# Patient Record
Sex: MALE | Race: ASIAN | NOT HISPANIC OR LATINO | URBAN - METROPOLITAN AREA
[De-identification: names, ages, dates, MRNs, and addresses within clinical notes are randomized per-mention and may not be internally consistent; named-entity substitution may affect disease eponyms.]

---

## 2018-12-19 PROBLEM — Z00.00 ENCOUNTER FOR PREVENTIVE HEALTH EXAMINATION: Status: ACTIVE | Noted: 2018-12-19

## 2020-11-10 ENCOUNTER — INPATIENT (INPATIENT)
Facility: HOSPITAL | Age: 25
LOS: 2 days | Discharge: HOME | End: 2020-11-13
Attending: STUDENT IN AN ORGANIZED HEALTH CARE EDUCATION/TRAINING PROGRAM | Admitting: STUDENT IN AN ORGANIZED HEALTH CARE EDUCATION/TRAINING PROGRAM
Payer: MEDICAID

## 2020-11-10 VITALS
OXYGEN SATURATION: 100 % | HEART RATE: 108 BPM | SYSTOLIC BLOOD PRESSURE: 116 MMHG | DIASTOLIC BLOOD PRESSURE: 83 MMHG | RESPIRATION RATE: 16 BRPM | TEMPERATURE: 97 F

## 2020-11-10 LAB
ALBUMIN SERPL ELPH-MCNC: 4.1 G/DL — SIGNIFICANT CHANGE UP (ref 3.3–5)
ALP SERPL-CCNC: 70 U/L — SIGNIFICANT CHANGE UP (ref 40–120)
ALT FLD-CCNC: 13 U/L — SIGNIFICANT CHANGE UP (ref 4–41)
ANION GAP SERPL CALC-SCNC: 18 MMO/L — HIGH (ref 7–14)
AST SERPL-CCNC: 15 U/L — SIGNIFICANT CHANGE UP (ref 4–40)
BASOPHILS # BLD AUTO: 0.04 K/UL — SIGNIFICANT CHANGE UP (ref 0–0.2)
BASOPHILS NFR BLD AUTO: 0.2 % — SIGNIFICANT CHANGE UP (ref 0–2)
BILIRUB SERPL-MCNC: 0.3 MG/DL — SIGNIFICANT CHANGE UP (ref 0.2–1.2)
BUN SERPL-MCNC: 15 MG/DL — SIGNIFICANT CHANGE UP (ref 7–23)
CALCIUM SERPL-MCNC: 10.2 MG/DL — SIGNIFICANT CHANGE UP (ref 8.4–10.5)
CHLORIDE SERPL-SCNC: 101 MMOL/L — SIGNIFICANT CHANGE UP (ref 98–107)
CO2 SERPL-SCNC: 23 MMOL/L — SIGNIFICANT CHANGE UP (ref 22–31)
CREAT SERPL-MCNC: 0.8 MG/DL — SIGNIFICANT CHANGE UP (ref 0.5–1.3)
EOSINOPHIL # BLD AUTO: 0.01 K/UL — SIGNIFICANT CHANGE UP (ref 0–0.5)
EOSINOPHIL NFR BLD AUTO: 0.1 % — SIGNIFICANT CHANGE UP (ref 0–6)
GLUCOSE SERPL-MCNC: 138 MG/DL — HIGH (ref 70–99)
HCT VFR BLD CALC: 47.6 % — SIGNIFICANT CHANGE UP (ref 39–50)
HGB BLD-MCNC: 13.6 G/DL — SIGNIFICANT CHANGE UP (ref 13–17)
IMM GRANULOCYTES NFR BLD AUTO: 0.6 % — SIGNIFICANT CHANGE UP (ref 0–1.5)
LIDOCAIN IGE QN: 14.4 U/L — SIGNIFICANT CHANGE UP (ref 7–60)
LYMPHOCYTES # BLD AUTO: 0.69 K/UL — LOW (ref 1–3.3)
LYMPHOCYTES # BLD AUTO: 4 % — LOW (ref 13–44)
MAGNESIUM SERPL-MCNC: 1.9 MG/DL — SIGNIFICANT CHANGE UP (ref 1.6–2.6)
MCHC RBC-ENTMCNC: 21.2 PG — LOW (ref 27–34)
MCHC RBC-ENTMCNC: 28.6 % — LOW (ref 32–36)
MCV RBC AUTO: 74.1 FL — LOW (ref 80–100)
MONOCYTES # BLD AUTO: 1.07 K/UL — HIGH (ref 0–0.9)
MONOCYTES NFR BLD AUTO: 6.1 % — SIGNIFICANT CHANGE UP (ref 2–14)
NEUTROPHILS # BLD AUTO: 15.52 K/UL — HIGH (ref 1.8–7.4)
NEUTROPHILS NFR BLD AUTO: 89 % — HIGH (ref 43–77)
NRBC # FLD: 0 K/UL — SIGNIFICANT CHANGE UP (ref 0–0)
PLATELET # BLD AUTO: 486 K/UL — HIGH (ref 150–400)
PMV BLD: 9.5 FL — SIGNIFICANT CHANGE UP (ref 7–13)
POTASSIUM SERPL-MCNC: 3.7 MMOL/L — SIGNIFICANT CHANGE UP (ref 3.5–5.3)
POTASSIUM SERPL-SCNC: 3.7 MMOL/L — SIGNIFICANT CHANGE UP (ref 3.5–5.3)
PROT SERPL-MCNC: 7.9 G/DL — SIGNIFICANT CHANGE UP (ref 6–8.3)
RBC # BLD: 6.42 M/UL — HIGH (ref 4.2–5.8)
RBC # FLD: 21.6 % — HIGH (ref 10.3–14.5)
SODIUM SERPL-SCNC: 142 MMOL/L — SIGNIFICANT CHANGE UP (ref 135–145)
WBC # BLD: 17.43 K/UL — HIGH (ref 3.8–10.5)
WBC # FLD AUTO: 17.43 K/UL — HIGH (ref 3.8–10.5)

## 2020-11-10 PROCEDURE — 99285 EMERGENCY DEPT VISIT HI MDM: CPT

## 2020-11-10 RX ORDER — SODIUM CHLORIDE 9 MG/ML
1000 INJECTION INTRAMUSCULAR; INTRAVENOUS; SUBCUTANEOUS ONCE
Refills: 0 | Status: COMPLETED | OUTPATIENT
Start: 2020-11-10 | End: 2020-11-10

## 2020-11-10 RX ORDER — ONDANSETRON 8 MG/1
4 TABLET, FILM COATED ORAL ONCE
Refills: 0 | Status: COMPLETED | OUTPATIENT
Start: 2020-11-10 | End: 2020-11-10

## 2020-11-10 RX ADMIN — SODIUM CHLORIDE 1000 MILLILITER(S): 9 INJECTION INTRAMUSCULAR; INTRAVENOUS; SUBCUTANEOUS at 22:13

## 2020-11-10 RX ADMIN — ONDANSETRON 4 MILLIGRAM(S): 8 TABLET, FILM COATED ORAL at 19:30

## 2020-11-10 RX ADMIN — SODIUM CHLORIDE 1000 MILLILITER(S): 9 INJECTION INTRAMUSCULAR; INTRAVENOUS; SUBCUTANEOUS at 19:31

## 2020-11-10 NOTE — ED ADULT NURSE NOTE - CHIEF COMPLAINT QUOTE
Pt c/o vomiting since last night states he was recently dx with IBD, denies other PMH. +abdominal pain

## 2020-11-10 NOTE — ED PROVIDER NOTE - CLINICAL SUMMARY MEDICAL DECISION MAKING FREE TEXT BOX
26 y/o M recently dx with Crohn's p/w intractable vomiting x today.  plan  - labs  - ivf  - anti-emetics   - PO challenge Marcel att: 26 y/o M recently dx with Crohn's p/w intractable vomiting x today with diffuse abdominal pain. The patient says that his pain had been controlled with budesonide until now.   -labs, pain control, CT abdomen/pelvis to r/o SBO vs. fistula.    - labs  - ivf  - anti-emetics   - PO challenge

## 2020-11-10 NOTE — ED PROVIDER NOTE - OBJECTIVE STATEMENT
24 y/o M recently dx with Crohn's p/w intractable vomiting x today. Pt reports he was recent admitted to OSH where he had full work up including endoscopy, work up highly suspicious for Crohn's. Pt started on Budesonide, which states has improved symptoms. States today started having mid abdominal pain a/w n/v. Has not been able to tolerate anything PO. Reports emesis every 5-10 minutes. Pt reports normal BMs. No fever, chills, chest pain, sob, cough, headache, dizziness.

## 2020-11-10 NOTE — ED PROVIDER NOTE - CONSTITUTIONAL, MLM
normal... Well appearing, awake, alert, oriented to person, place, time/situation. Actively vomiting.

## 2020-11-10 NOTE — ED PROVIDER NOTE - CCCP TRG CHIEF CMPLNT
Consent: The patient's consent was obtained including but not limited to risks of crusting, scabbing, blistering, scarring, darker or lighter pigmentary change, recurrence, incomplete removal and infection.
Detail Level: Detailed
Number Of Freeze-Thaw Cycles: 3 freeze-thaw cycles
Render Post-Care Instructions In Note?: no
Duration Of Freeze Thaw-Cycle (Seconds): 3
Post-Care Instructions: I reviewed with the patient in detail post-care instructions. Patient is to wear sunprotection, and avoid picking at any of the treated lesions. Pt may apply Vaseline to crusted or scabbing areas.
vomiting

## 2020-11-11 DIAGNOSIS — K56.609 UNSPECIFIED INTESTINAL OBSTRUCTION, UNSPECIFIED AS TO PARTIAL VERSUS COMPLETE OBSTRUCTION: ICD-10-CM

## 2020-11-11 LAB
24R-OH-CALCIDIOL SERPL-MCNC: 29.5 NG/ML — LOW (ref 30–80)
ALBUMIN SERPL ELPH-MCNC: 3.2 G/DL — LOW (ref 3.3–5)
ALP SERPL-CCNC: 56 U/L — SIGNIFICANT CHANGE UP (ref 40–120)
ALT FLD-CCNC: 12 U/L — SIGNIFICANT CHANGE UP (ref 4–41)
ANION GAP SERPL CALC-SCNC: 11 MMO/L — SIGNIFICANT CHANGE UP (ref 7–14)
APTT BLD: 28 SEC — SIGNIFICANT CHANGE UP (ref 27–36.3)
AST SERPL-CCNC: 10 U/L — SIGNIFICANT CHANGE UP (ref 4–40)
BILIRUB DIRECT SERPL-MCNC: < 0.2 MG/DL — SIGNIFICANT CHANGE UP (ref 0.1–0.2)
BILIRUB SERPL-MCNC: 0.3 MG/DL — SIGNIFICANT CHANGE UP (ref 0.2–1.2)
BLD GP AB SCN SERPL QL: NEGATIVE — SIGNIFICANT CHANGE UP
BUN SERPL-MCNC: 13 MG/DL — SIGNIFICANT CHANGE UP (ref 7–23)
CALCIUM SERPL-MCNC: 8.9 MG/DL — SIGNIFICANT CHANGE UP (ref 8.4–10.5)
CHLORIDE SERPL-SCNC: 108 MMOL/L — HIGH (ref 98–107)
CO2 SERPL-SCNC: 23 MMOL/L — SIGNIFICANT CHANGE UP (ref 22–31)
CREAT SERPL-MCNC: 0.7 MG/DL — SIGNIFICANT CHANGE UP (ref 0.5–1.3)
CRP SERPL-MCNC: 58.9 MG/L — HIGH
ERYTHROCYTE [SEDIMENTATION RATE] IN BLOOD: 23 MM/HR — HIGH (ref 1–15)
FERRITIN SERPL-MCNC: 76.55 NG/ML — SIGNIFICANT CHANGE UP (ref 30–400)
FOLATE SERPL-MCNC: 14.8 NG/ML — SIGNIFICANT CHANGE UP (ref 4.7–20)
GLUCOSE SERPL-MCNC: 100 MG/DL — HIGH (ref 70–99)
HCT VFR BLD CALC: 37.8 % — LOW (ref 39–50)
HGB BLD-MCNC: 10.8 G/DL — LOW (ref 13–17)
INR BLD: 1.33 — HIGH (ref 0.88–1.16)
IRON SATN MFR SERPL: 15 UG/DL — LOW (ref 45–165)
IRON SATN MFR SERPL: 191 UG/DL — SIGNIFICANT CHANGE UP (ref 155–535)
MAGNESIUM SERPL-MCNC: 1.9 MG/DL — SIGNIFICANT CHANGE UP (ref 1.6–2.6)
MCHC RBC-ENTMCNC: 21.7 PG — LOW (ref 27–34)
MCHC RBC-ENTMCNC: 28.6 % — LOW (ref 32–36)
MCV RBC AUTO: 76.1 FL — LOW (ref 80–100)
NRBC # FLD: 0 K/UL — SIGNIFICANT CHANGE UP (ref 0–0)
PHOSPHATE SERPL-MCNC: 4.7 MG/DL — HIGH (ref 2.5–4.5)
PLATELET # BLD AUTO: 370 K/UL — SIGNIFICANT CHANGE UP (ref 150–400)
PMV BLD: 9.3 FL — SIGNIFICANT CHANGE UP (ref 7–13)
POTASSIUM SERPL-MCNC: 4.3 MMOL/L — SIGNIFICANT CHANGE UP (ref 3.5–5.3)
POTASSIUM SERPL-SCNC: 4.3 MMOL/L — SIGNIFICANT CHANGE UP (ref 3.5–5.3)
PROT SERPL-MCNC: 5.5 G/DL — LOW (ref 6–8.3)
PROTHROM AB SERPL-ACNC: 15 SEC — HIGH (ref 10.6–13.6)
RBC # BLD: 4.97 M/UL — SIGNIFICANT CHANGE UP (ref 4.2–5.8)
RBC # FLD: 20.6 % — HIGH (ref 10.3–14.5)
RH IG SCN BLD-IMP: POSITIVE — SIGNIFICANT CHANGE UP
SARS-COV-2 IGG SERPL QL IA: NEGATIVE — SIGNIFICANT CHANGE UP
SARS-COV-2 IGM SERPL IA-ACNC: <0.1 INDEX — SIGNIFICANT CHANGE UP
SARS-COV-2 RNA SPEC QL NAA+PROBE: SIGNIFICANT CHANGE UP
SODIUM SERPL-SCNC: 142 MMOL/L — SIGNIFICANT CHANGE UP (ref 135–145)
UIBC SERPL-MCNC: 175.7 UG/DL — SIGNIFICANT CHANGE UP (ref 110–370)
VIT B12 SERPL-MCNC: 1699 PG/ML — HIGH (ref 200–900)
WBC # BLD: 9.39 K/UL — SIGNIFICANT CHANGE UP (ref 3.8–10.5)
WBC # FLD AUTO: 9.39 K/UL — SIGNIFICANT CHANGE UP (ref 3.8–10.5)

## 2020-11-11 PROCEDURE — 99223 1ST HOSP IP/OBS HIGH 75: CPT

## 2020-11-11 PROCEDURE — 71045 X-RAY EXAM CHEST 1 VIEW: CPT | Mod: 26

## 2020-11-11 PROCEDURE — 99222 1ST HOSP IP/OBS MODERATE 55: CPT | Mod: GC

## 2020-11-11 PROCEDURE — 74177 CT ABD & PELVIS W/CONTRAST: CPT | Mod: 26

## 2020-11-11 RX ORDER — ACETAMINOPHEN 500 MG
1000 TABLET ORAL ONCE
Refills: 0 | Status: COMPLETED | OUTPATIENT
Start: 2020-11-11 | End: 2020-11-11

## 2020-11-11 RX ORDER — TETRACAINE/BENZOCAINE/BUTAMBEN 2%-14%-2%
1 OINTMENT (GRAM) TOPICAL
Refills: 0 | Status: DISCONTINUED | OUTPATIENT
Start: 2020-11-11 | End: 2020-11-13

## 2020-11-11 RX ORDER — BENZOCAINE AND MENTHOL 5; 1 G/100ML; G/100ML
1 LIQUID ORAL
Refills: 0 | Status: DISCONTINUED | OUTPATIENT
Start: 2020-11-11 | End: 2020-11-13

## 2020-11-11 RX ORDER — BENZOCAINE AND MENTHOL 5; 1 G/100ML; G/100ML
1 LIQUID ORAL ONCE
Refills: 0 | Status: COMPLETED | OUTPATIENT
Start: 2020-11-11 | End: 2020-11-11

## 2020-11-11 RX ORDER — ENOXAPARIN SODIUM 100 MG/ML
40 INJECTION SUBCUTANEOUS DAILY
Refills: 0 | Status: DISCONTINUED | OUTPATIENT
Start: 2020-11-11 | End: 2020-11-13

## 2020-11-11 RX ORDER — METRONIDAZOLE 500 MG
500 TABLET ORAL EVERY 8 HOURS
Refills: 0 | Status: DISCONTINUED | OUTPATIENT
Start: 2020-11-11 | End: 2020-11-13

## 2020-11-11 RX ORDER — MAGNESIUM SULFATE 500 MG/ML
1 VIAL (ML) INJECTION ONCE
Refills: 0 | Status: COMPLETED | OUTPATIENT
Start: 2020-11-11 | End: 2020-11-11

## 2020-11-11 RX ORDER — CIPROFLOXACIN LACTATE 400MG/40ML
400 VIAL (ML) INTRAVENOUS EVERY 12 HOURS
Refills: 0 | Status: DISCONTINUED | OUTPATIENT
Start: 2020-11-11 | End: 2020-11-13

## 2020-11-11 RX ORDER — SODIUM CHLORIDE 9 MG/ML
1000 INJECTION, SOLUTION INTRAVENOUS
Refills: 0 | Status: DISCONTINUED | OUTPATIENT
Start: 2020-11-11 | End: 2020-11-12

## 2020-11-11 RX ADMIN — Medication 1 SPRAY(S): at 05:36

## 2020-11-11 RX ADMIN — Medication 100 MILLIGRAM(S): at 21:23

## 2020-11-11 RX ADMIN — SODIUM CHLORIDE 125 MILLILITER(S): 9 INJECTION, SOLUTION INTRAVENOUS at 01:23

## 2020-11-11 RX ADMIN — Medication 200 MILLIGRAM(S): at 05:36

## 2020-11-11 RX ADMIN — ENOXAPARIN SODIUM 40 MILLIGRAM(S): 100 INJECTION SUBCUTANEOUS at 11:15

## 2020-11-11 RX ADMIN — Medication 200 MILLIGRAM(S): at 18:00

## 2020-11-11 RX ADMIN — BENZOCAINE AND MENTHOL 1 LOZENGE: 5; 1 LIQUID ORAL at 03:36

## 2020-11-11 RX ADMIN — Medication 100 MILLIGRAM(S): at 03:35

## 2020-11-11 RX ADMIN — Medication 100 GRAM(S): at 11:15

## 2020-11-11 RX ADMIN — SODIUM CHLORIDE 125 MILLILITER(S): 9 INJECTION, SOLUTION INTRAVENOUS at 03:36

## 2020-11-11 RX ADMIN — Medication 400 MILLIGRAM(S): at 02:37

## 2020-11-11 RX ADMIN — Medication 100 MILLIGRAM(S): at 14:19

## 2020-11-11 RX ADMIN — Medication 20 MILLIGRAM(S): at 21:23

## 2020-11-11 NOTE — CONSULT NOTE ADULT - ATTENDING COMMENTS
Grisel Mullins  Pager: [phone]. If no response or past 5 pm call [phone].
History/PE as above. Patient seen/examined. Admitted with small bowel obstruction attributed to active Crohn's disease. Recent diagnosis of Crohn's disease noted-past 6 months has experienced significant weight loss and abdominal pain. Diagnosed at [facility] 10/2020. Had double balloon enteroscopy one week ago. So far treated with budesonide 9 mg daily. Patient had felt he was improving but prior to admission experienced increased severity of abdominal pain and multiple episodes of vomiting prompting hospitalization with detection of high-grade SBO. Patient's Crohn disease is more in the proximal ileum/distal jejunum. Patient had been planned to initiate monotherapy with Remicade. However, indeterminant QTF resulted in delay in initiation of therapy. Today patient reports passing some flatus. No bowel movement yet. Experiencing some discomfort from NG tube but otherwise comfortable/NAD. Abdomen-soft/nondistended.     IMP: Crohn's enteritis-somewhat proximal disease affecting proximal ileum/distal jejunum. Active inflammatory component without evident fistula, abscess et cetera. High-grade obstruction noted on CT.    REC:  -N.p.o./IV hydration and an NGT decompression as per surgery team  -Monitor serial CBC/BMP  -Monitor CRP/ESR q.o.d.  -DVT prophylaxis  -Currently on IV Cipro/metronidazole. Okay to continue for now  -In view of progression of disease complicated by small bowel obstruction despite oral budesonide plan is for Solu-Medrol 20 mg IV 8 hours. Hopefully patient will exhibit prompt response with resolution of SBO permitting NGT removal.  -Pending ID clearance will proceed with initiation of therapy with Remicade 10 mg/kilogram.

## 2020-11-11 NOTE — H&P ADULT - NSHPPHYSICALEXAM_GEN_ALL_CORE
General: No Acute Distress.  Neuro: Alert and oriented, no focal deficits, moves all extremities spontaneously.  HEENT: Extraocular movements intact. Mucosa moist.   Respiratory: Airway patent, respirations unlabored.  Cardiovascular: Pulse present.   Abdomen: Thin, soft, tender in L abdomen, nondistended.  Genitourinary: No obvious lesions.  Extremities: Warm, moves all four.  Musculoskeletal: No tenderness of extremities, mobile joints.   Integumentary: No obvious lesions.

## 2020-11-11 NOTE — CONSULT NOTE ADULT - SUBJECTIVE AND OBJECTIVE BOX
Patient is a 25y old  Male who presents with a chief complaint of Small bowel obstruction due to Crohn's stricture (11 Nov 2020 07:59)      HPI:  Mr. Griffiths is a 25 Year-Old Gentleman recently diagnosed with Crohn's on budesonide, presenting with one day of severe abdominal pain, nausea/emesis. He initially presented last month to [facility] with similar symptoms of abdominal pain, nausea/emesis, had workup done including MR Enterography, Push Enteroscopy with biopsy consistent with Crohn's. He was started on budesonide with plan to transition to Remicade. However since yesterday evening, had one episode of abdominal pain, emesis that resolved. Was able to tolerate Per Os intake earlier in day, however in evening developed severe symptoms of pain, nausea/emesis, so he presented to the hospital. Has not passed gas or had bowel movement since onset of symptoms.     On evaluation in the Emergency Department, vitals were notable for tachycardia to 100s. Physical exam notable for L sided abdominal tenderness to palpation. Labs drawn, notable for WBC 17.4. CT Imaging obtained, with findings of short segment jejunal/ileal thickening causing small bowel obstruction. Nasogastric tube placed in ED with 250 cc clear yellow output.   Above reviewed:   Pt says he was born in [place], moved to [place] when he was 2 months old, has visited a few times not within last few years. 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 No respiratory symptoms. No exposure to anyone with known TB, never incarcerated.       PAST MEDICAL & SURGICAL HISTORY:  Crohn disease    No significant past surgical history        REVIEW OF SYSTEMS    General: Denies any chills. Fevers absent    Skin: No rash  	  Ophthalmologic: Denies any visual complaints, discharge, redness.  	  ENT: No nasal congestion or throat pain.     Respiratory and Thorax: No cough, sputum. Denies shortness of breath.  	  Cardiovascular: No chest pain,    Gastrointestinal: per hpi     Genitourinary: No dysuria, frequency. No flank pain.    Musculoskeletal: No joint swelling.    Neurological: No extremity weakness.    Psychiatric: No hallucinations	    Extremities: No swelling     Endocrine: No polyuria or polydipsia     Allergic/Immunologic: No hives       Social history:  lives with family, medical student, no smoking       FAMILY HISTORY:  No family hx of autoimmune ds in first degree relatives.       Allergies  No Known Allergies      Antimicrobials:  ciprofloxacin   IVPB 400 milliGRAM(s) IV Intermittent every 12 hours  metroNIDAZOLE  IVPB 500 milliGRAM(s) IV Intermittent every 8 hours      Vital Signs Last 24 Hrs  T(C): 36.3 (11 Nov 2020 09:31), Max: 36.9 (10 Nov 2020 22:10)  T(F): 97.3 (11 Nov 2020 09:31), Max: 98.5 (10 Nov 2020 22:10)  HR: 98 (11 Nov 2020 09:31) (92 - 109)  BP: 110/66 (11 Nov 2020 09:31) (105/69 - 122/85)  BP(mean): --  RR: 16 (11 Nov 2020 09:31) (16 - 18)  SpO2: 97% (11 Nov 2020 09:31) (97% - 100%)      PHYSICAL EXAM: Patient in no acute distress.    Constitutional: Comfortable. Awake and alert    Eyes: No discharge or conjunctival injection    ENT: No thrush. No pharyngeal exudate or erythema.    Neck: Supple, + NG tube     Respiratory: Good air entry bilaterally.    Cardiovascular: S1 S2 wnl, No murmurs.    Gastrointestinal: Soft BS(+) no tenderness, non distended.    Genitourinary: No CVA tenderness     Extremities: No edema.    Vascular: peripheral pulses felt    Neurological: AAO X 3. No grossly focal deficits.    Skin: No rash     Musculoskeletal: No joint swelling.    Psychiatric: Affect normal.                              10.8   9.39  )-----------( 370      ( 11 Nov 2020 04:50 )             37.8       11-11    142  |  108<H>  |  13  ----------------------------<  100<H>  4.3   |  23  |  0.70    Ca    8.9      11 Nov 2020 04:50  Phos  4.7     11-11  Mg     1.9     11-11    TPro  5.5<L>  /  Alb  3.2<L>  /  TBili  0.3  /  DBili  < 0.2  /  AST  10  /  ALT  12  /  AlkPhos  56  11-11      COVID-19 PCR . (11.11.20 @ 01:42)   COVID-19 PCR: NotDetec      Radiology: Imaging reviewed and visualized personally [ x]    < from: CT Abdomen and Pelvis w/ IV Cont (11.11.20 @ 00:10) >  IMPRESSION:  Discontinuous areas of bowel wall thickening and luminal narrowing in the distal jejunum/proximal ileum located in the lowerabdomen compatible with foci of enteritis with possible stricturing likely correlating with history of Crohn's disease resulting in an upstream small bowel obstruction.      < from: Xray Chest 1 View- PORTABLE-Urgent (Xray Chest 1 View- PORTABLE-Urgent .) (11.11.20 @ 06:11) >  FINDINGS:      Lines and Tubes: The tip of the enteric tube is in the distal esophagus. The proximal side-port is in the distal esophagus.      Lungs: The lungs are clear.      Pleura: No pleural effusion or pneumothorax.      Heart and Mediastinum: The heart is normal in size.      Skeletal: Unremarkable.

## 2020-11-11 NOTE — PATIENT PROFILE ADULT - VISION (WITH CORRECTIVE LENSES IF THE PATIENT USUALLY WEARS THEM):
Partially impaired: cannot see medication labels or newsprint, but can see obstacles in path, and the surrounding layout; can count fingers at arm's length
suprapubic pain

## 2020-11-11 NOTE — CONSULT NOTE ADULT - SUBJECTIVE AND OBJECTIVE BOX
Chief Complaint:  Patient is a 25y old  Male who presents with a chief complaint of Small bowel obstruction due to Crohn's stricture (11 Nov 2020 03:45)      HPI:  Mr. Griffiths is a 26yo M with recently diagnosed Crohn's who presents with abdominal pain x 1d.    Patient with recently diagnosed Crohn's a month prior to presenation, after presenting to [facility] with abdominal pain. He was started on budesonide with plan to transition to Remicade. He follows with Dr. Alfie Rivers.   His symptoms improved until the day prior to presentation when he started having diffuse severe, abdominal pain, associated with nausea and emesis. No flatus since onset of symptoms.     In the ED: HR 100s. Surgery consulted, physical exam notable for L sided abdominal tenderness to palpation. Labs drawn, notable for WBC 17.4. CT Imaging obtained, with findings of short segment jejunal/ileal thickening causing small bowel obstruction. Nasogastric tube placed in ED with 250 cc clear yellow output.       Allergies:  No Known Allergies      Home Medications:  budesonide 9 mg oral tablet, extended release: 1 tab(s) orally once a day (in the morning) (11 Nov 2020 03:53)  cyanocobalamin 100 mcg oral tablet: 1 tab(s) orally once a day (11 Nov 2020 03:53)  folic acid 1 mg oral tablet: 1 tab(s) orally once a day (11 Nov 2020 03:53)  Vitamin D3 2000 intl units (50 mcg) oral tablet: 1 tab(s) orally once a day (11 Nov 2020 03:53)    Hospital Medications:  benzocaine 15 mG/menthol 3.6 mG (Sugar-Free) Lozenge 1 Lozenge Oral every 3 hours PRN  ciprofloxacin   IVPB 400 milliGRAM(s) IV Intermittent every 12 hours  enoxaparin Injectable 40 milliGRAM(s) SubCutaneous daily  lactated ringers. 1000 milliLiter(s) IV Continuous <Continuous>  metroNIDAZOLE  IVPB 500 milliGRAM(s) IV Intermittent every 8 hours  tetracaine/benzocaine/butamben Spray 1 Spray(s) Topical every 3 hours PRN      PMHX/PSHX:  Crohn disease    No significant past surgical history        Family history:      Denies family history of colon cancer/polyps, stomach cancer/polyps, pancreatic cancer/masses, liver cancer/disease, ovarian cancer and endometrial cancer.    Social History:     Tob: Denies  EtOH: Denies  Illicit Drugs: Denies    ROS:   General:  No wt loss, fevers, chills, night sweats, fatigue  Eyes:  Good vision, no reported pain  ENT:  No sore throat, pain, runny nose, dysphagia  CV:  No pain, palpitations, hypo/hypertension  Pulm:  No dyspnea, cough, tachypnea, wheezing  GI:  As above  :  No pain, bleeding, incontinence, nocturia  Muscle:  No pain, weakness  Neuro:  No weakness, tingling, memory problems  Psych:  No fatigue, insomnia, mood problems, depression  Endocrine:  No polyuria, polydipsia, cold/heat intolerance  Heme:  No petechiae, ecchymosis, easy bruisability  Skin:  No rash, tattoos, scars, edema    PHYSICAL EXAM:     GENERAL:  No acute distress  HEENT:  Normocephalic/atraumatic, no scleral icterus  CHEST:  Clear to auscultation bilaterally, no wheezes/rales/ronchi, no accessory muscle use  HEART:  Regular rate and rhythm, no murmurs/rubs/gallops  ABDOMEN:  Soft, ttp in LLQ, no rebound or guarding, non-distended, no masses, no hepato-splenomegaly, no signs of chronic liver disease  EXTREMITIES: No cyanosis, clubbing, or edema  SKIN:  No rash/erythema/ecchymoses/petechiae/wounds/abscess/warm/dry  NEURO:  Alert and oriented x 3, no asterixis    Vital Signs:  Vital Signs Last 24 Hrs  T(C): 36.3 (11 Nov 2020 05:40), Max: 36.9 (10 Nov 2020 22:10)  T(F): 97.4 (11 Nov 2020 05:40), Max: 98.5 (10 Nov 2020 22:10)  HR: 92 (11 Nov 2020 05:40) (92 - 109)  BP: 112/72 (11 Nov 2020 05:40) (105/69 - 122/85)  BP(mean): --  RR: 16 (11 Nov 2020 05:40) (16 - 18)  SpO2: 98% (11 Nov 2020 05:40) (98% - 100%)  Daily Height in cm: 185.42 (11 Nov 2020 03:44)    Daily     LABS:                        10.8   9.39  )-----------( 370      ( 11 Nov 2020 04:50 )             37.8     Mean Cell Volume: 76.1 fL (11-11-20 @ 04:50)    11-11    142  |  108<H>  |  13  ----------------------------<  100<H>  4.3   |  23  |  0.70    Ca    8.9      11 Nov 2020 04:50  Phos  4.7     11-11  Mg     1.9     11-11    TPro  5.5<L>  /  Alb  3.2<L>  /  TBili  0.3  /  DBili  < 0.2  /  AST  10  /  ALT  12  /  AlkPhos  56  11-11    LIVER FUNCTIONS - ( 11 Nov 2020 04:50 )  Alb: 3.2 g/dL / Pro: 5.5 g/dL / ALK PHOS: 56 u/L / ALT: 12 u/L / AST: 10 u/L / GGT: x           PT/INR - ( 11 Nov 2020 01:15 )   PT: 15.0 SEC;   INR: 1.33          PTT - ( 11 Nov 2020 01:15 )  PTT:28.0 SEC    Amylase Serum--      Lipase serum14.4       Ammonia--                          10.8   9.39  )-----------( 370      ( 11 Nov 2020 04:50 )             37.8                         13.6   17.43 )-----------( 486      ( 10 Nov 2020 19:30 )             47.6       Imaging:           Chief Complaint:  Patient is a 25y old  Male who presents with a chief complaint of Small bowel obstruction due to Crohn's stricture (11 Nov 2020 03:45)      HPI:  Mr. Griffiths is a 24yo M with recently diagnosed Crohn's who presents with abdominal pain x 1d.    Patient recently admitted at [facility] (10/20) for abdominal pain. Imaging and double balloon enteroscopy highly suggestive of Crohn's disease (biopsy pending). He was started on budesonide with plan to transition to Remicade. He follows with Dr. Alfie Griffiths.   His symptoms improved until the day prior to presentation when he started having diffuse severe, abdominal pain, associated with nausea and emesis. No flatus since onset of symptoms.     In the ED: HR 100s. Surgery consulted, physical exam notable for L sided abdominal tenderness to palpation. Labs drawn, notable for WBC 17.4. CT Imaging obtained, with findings of short segment jejunal/ileal thickening causing small bowel obstruction. Nasogastric tube placed in ED with 250 cc clear yellow output.       Allergies:  No Known Allergies      Home Medications:  budesonide 9 mg oral tablet, extended release: 1 tab(s) orally once a day (in the morning) (11 Nov 2020 03:53)  cyanocobalamin 100 mcg oral tablet: 1 tab(s) orally once a day (11 Nov 2020 03:53)  folic acid 1 mg oral tablet: 1 tab(s) orally once a day (11 Nov 2020 03:53)  Vitamin D3 2000 intl units (50 mcg) oral tablet: 1 tab(s) orally once a day (11 Nov 2020 03:53)    Hospital Medications:  benzocaine 15 mG/menthol 3.6 mG (Sugar-Free) Lozenge 1 Lozenge Oral every 3 hours PRN  ciprofloxacin   IVPB 400 milliGRAM(s) IV Intermittent every 12 hours  enoxaparin Injectable 40 milliGRAM(s) SubCutaneous daily  lactated ringers. 1000 milliLiter(s) IV Continuous <Continuous>  metroNIDAZOLE  IVPB 500 milliGRAM(s) IV Intermittent every 8 hours  tetracaine/benzocaine/butamben Spray 1 Spray(s) Topical every 3 hours PRN      PMHX/PSHX:  Crohn disease    No significant past surgical history        Family history:      Denies family history of colon cancer/polyps, stomach cancer/polyps, pancreatic cancer/masses, liver cancer/disease, ovarian cancer and endometrial cancer.    Social History:     Tob: Denies  EtOH: Denies  Illicit Drugs: Denies    ROS:   General:  No wt loss, fevers, chills, night sweats, fatigue  Eyes:  Good vision, no reported pain  ENT:  No sore throat, pain, runny nose, dysphagia  CV:  No pain, palpitations, hypo/hypertension  Pulm:  No dyspnea, cough, tachypnea, wheezing  GI:  As above  :  No pain, bleeding, incontinence, nocturia  Muscle:  No pain, weakness  Neuro:  No weakness, tingling, memory problems  Psych:  No fatigue, insomnia, mood problems, depression  Endocrine:  No polyuria, polydipsia, cold/heat intolerance  Heme:  No petechiae, ecchymosis, easy bruisability  Skin:  No rash, tattoos, scars, edema    PHYSICAL EXAM:     GENERAL:  No acute distress  HEENT:  Normocephalic/atraumatic, no scleral icterus  CHEST:  Clear to auscultation bilaterally, no wheezes/rales/ronchi, no accessory muscle use  HEART:  Regular rate and rhythm, no murmurs/rubs/gallops  ABDOMEN:  Soft, ttp in LLQ, no rebound or guarding, non-distended, no masses, no hepato-splenomegaly, no signs of chronic liver disease  EXTREMITIES: No cyanosis, clubbing, or edema  SKIN:  No rash/erythema/ecchymoses/petechiae/wounds/abscess/warm/dry  NEURO:  Alert and oriented x 3, no asterixis    Vital Signs:  Vital Signs Last 24 Hrs  T(C): 36.3 (11 Nov 2020 05:40), Max: 36.9 (10 Nov 2020 22:10)  T(F): 97.4 (11 Nov 2020 05:40), Max: 98.5 (10 Nov 2020 22:10)  HR: 92 (11 Nov 2020 05:40) (92 - 109)  BP: 112/72 (11 Nov 2020 05:40) (105/69 - 122/85)  BP(mean): --  RR: 16 (11 Nov 2020 05:40) (16 - 18)  SpO2: 98% (11 Nov 2020 05:40) (98% - 100%)  Daily Height in cm: 185.42 (11 Nov 2020 03:44)    Daily     LABS:                        10.8   9.39  )-----------( 370      ( 11 Nov 2020 04:50 )             37.8     Mean Cell Volume: 76.1 fL (11-11-20 @ 04:50)    11-11    142  |  108<H>  |  13  ----------------------------<  100<H>  4.3   |  23  |  0.70    Ca    8.9      11 Nov 2020 04:50  Phos  4.7     11-11  Mg     1.9     11-11    TPro  5.5<L>  /  Alb  3.2<L>  /  TBili  0.3  /  DBili  < 0.2  /  AST  10  /  ALT  12  /  AlkPhos  56  11-11    LIVER FUNCTIONS - ( 11 Nov 2020 04:50 )  Alb: 3.2 g/dL / Pro: 5.5 g/dL / ALK PHOS: 56 u/L / ALT: 12 u/L / AST: 10 u/L / GGT: x           PT/INR - ( 11 Nov 2020 01:15 )   PT: 15.0 SEC;   INR: 1.33          PTT - ( 11 Nov 2020 01:15 )  PTT:28.0 SEC    Amylase Serum--      Lipase serum14.4       Ammonia--                          10.8   9.39  )-----------( 370      ( 11 Nov 2020 04:50 )             37.8                         13.6   17.43 )-----------( 486      ( 10 Nov 2020 19:30 )             47.6       Imaging:

## 2020-11-11 NOTE — H&P ADULT - HISTORY OF PRESENT ILLNESS
Mr. Griffiths is a 25 Year-Old Gentleman recently diagnosed with Crohn's on budesonide, presenting with one day of severe abdominal pain, nausea/emesis. He initially presented last month to [facility] with similar symptoms of abdominal pain, nausea/emesis, had workup done including MR Enterography, Push Enteroscopy with biopsy consistent with Crohn's. He was started on budesonide with plan to transition to Remicade. However since yesterday evening, had one episode of abdominal pain, emesis that resolved. Was able to tolerate Per Os intake earlier in day, however in evening developed severe symptoms of pain, nausea/emesis, so he presented to the hospital. Has not passed gas or had bowel movement since onset of symptoms.     On evaluation in the Emergency Department, vitals were notable for tachycardia to 100s. Physical exam notable for L sided abdominal tenderness to palpation. Labs drawn, notable for WBC 17.4. CT Imaging obtained, with findings of short segment jejunal/ileal thickening causing small bowel obstruction. Nasogastric tube placed in ED with 250 cc clear yellow output.     GI: Alfie Rivers.

## 2020-11-11 NOTE — H&P ADULT - ASSESSMENT
Mr. Griffiths is a 25 Year-Old Gentleman recently diagnosed with Crohn's, presenting with one day of severe abdominal pain, nausea/emesis, found to have small bowel obstruction due to Crohn's flare.     - Admit to Surgery, Dr. Oliver Ornelas.   - GI consult placed via Email @ 03:59, currently holding budesonide given NPO status.    - To continue Nasogastric Tube, Nil Per Os, Intravenous Fluids Resuscitation Therapy.   - IV antibiotics: Cipro/Flagyl.   - Will transfer to Colorectal Surgery Service in AM for definitive management.   - Discussed with Attending Surgeon.     B Team Surgery Pager #[phone] Mr. Griffiths is a 25 Year-Old Gentleman recently diagnosed with Crohn's, presenting with one day of severe abdominal pain, nausea/emesis, found to have small bowel obstruction due to Crohn's flare.     - Admit to Surgery, Dr. Oliver Ornelas.   - GI consult placed via Email @ 03:59, currently holding budesonide given NPO status.    - To continue Nasogastric Tube, Nil Per Os, Intravenous Fluids Resuscitation Therapy.   - IV antibiotics: Cipro/Flagyl.   - Crohn's labs and inflammatory markers ordered with AM labs.   - Will transfer to Colorectal Surgery Service in AM for definitive management.   - Discussed with Attending Surgeon.     B Team Surgery Pager #[phone]

## 2020-11-11 NOTE — CONSULT NOTE ADULT - ASSESSMENT
24yo M with recently diagnosed Crohn's who presents with abdominal pain x 1d, found to have SBO in settings of likely Crohn's stricture.    # Crohn's flare - Severe disease given stricturing and SBO. Currently on budesonide. Patient would benefit from initiation of induction therapy (i.e Infliximab), plus a course of IV steroids for a more immediate relief. Patient has documented negative HBV serology and indeterminate QuantiFeron on previous labs done at [facility].   # SBO - improving with conservative measures.    Recommendations:  - Please repeat HBV serologies and QuantiFeron in house  - Please consult ID for biologic clearance given "indeterminate" QuantiFeron result from OSH (negative CXR is reassuring)  - SBO management per surgical team  - CRP, ESR q48h  - CBC, CMP, INR daily  - Patient is at a high risk for VTE, please continue LMWH    Thank you for involving us in the care of this patient. Please reach out if any further questions.    Vaughn Cote, PGY-4  GI Fellow    Available on [employer] Teams  Pager [phone] ([facility]) or [phone] ([facility])  After 5PM/Weekends, please contact the on-call GI fellow: [phone]  Available through Microsoft Teams           24yo M with recently diagnosed Crohn's who presents with abdominal pain x 1d, found to have SBO in settings of likely Crohn's stricture.    # Likely Crohn's flare - Severe disease given stricturing and SBO. Currently on budesonide. Patient would benefit from initiation of induction therapy (i.e Infliximab), plus a course of IV steroids for a more immediate relief. Patient has documented negative HBV serology and indeterminate QuantiFeron on previous labs done at [facility].   # SBO - improving with conservative measures.    Recommendations:  - Please repeat HBV serologies and QuantiFeron in house  - Please consult ID for biologic clearance given "indeterminate" QuantiFeron result from OSH (negative CXR is reassuring)  - SBO management per surgical team  - CRP, ESR q48h  - CBC, CMP, INR daily  - Patient is at a high risk for VTE, please continue LMWH    Thank you for involving us in the care of this patient. Please reach out if any further questions.    Vaughn Cote, PGY-4  GI Fellow    Available on Microsoft Teams  Pager [phone] ([facility]) or [phone] ([facility])  After 5PM/Weekends, please contact the on-call GI fellow: [phone]  Available through Microsoft Teams           26yo M with recently diagnosed Crohn's who presents with abdominal pain x 1d, found to have SBO in settings of likely Crohn's stricture.    # Likely Crohn's flare - Severe disease given stricturing and SBO. Currently on budesonide. Patient would benefit from initiation of induction therapy (i.e Infliximab), plus a course of IV steroids for a more immediate relief. Patient has documented negative HBV serology and indeterminate QuantiFeron on previous labs done at [facility].   # SBO - improving with conservative measures.    Recommendations:  - Please repeat HBV serologies and QuantiFeron in house  - Please consult ID for biologic clearance given "indeterminate" QuantiFeron result from OSH (negative CXR is reassuring)  - Will likely need Remicade induction and steroids course  - SBO management per surgical team  - CRP, ESR q48h  - CBC, CMP, INR daily  - Patient is at a high risk for VTE, please continue LMWH    Thank you for involving us in the care of this patient. Please reach out if any further questions.    Vaughn Cote, PGY-4  GI Fellow    Available on Microsoft Teams  Pager [phone] ([facility]) or [phone] ([facility])  After 5PM/Weekends, please contact the on-call GI fellow: [phone]  Available through Microsoft Teams           24yo M with recently diagnosed Crohn's who presents with abdominal pain x 1d, found to have SBO in settings of likely Crohn's stricture.    # Likely Crohn's flare - Severe disease given stricturing and SBO. Currently on budesonide. Patient would benefit from initiation of induction therapy (i.e Infliximab), plus a course of IV steroids for a more immediate relief. Patient has documented negative HBV serology and indeterminate QuantiFeron on previous labs done at [facility]. Discussed with patient's GI doctor Dr. Griffiths, agrees with steroids and Remicade once ID clears patient.  # SBO - improving with conservative measures.    Recommendations:  - Please start patient on solumedrol 20mg q8h  - Please repeat HBV serologies and QuantiFeron in house  - Please consult ID for biologic clearance given "indeterminate" QuantiFeron result from OSH (negative CXR is reassuring) in anticipation of biologics  - SBO management per surgical team  - CRP, ESR q48h  - CBC, CMP, INR daily  - Patient is at a high risk for VTE, please continue LMWH    Thank you for involving us in the care of this patient. Please reach out if any further questions.    Vaughn Cote, PGY-4  GI Fellow    Available on Microsoft Teams  Pager [phone] ([facility]) or [phone] ([facility])  After 5PM/Weekends, please contact the on-call GI fellow: [phone]  Available through [employer] Teams

## 2020-11-11 NOTE — H&P ADULT - NSHPLABSRESULTS_GEN_ALL_CORE
Labs:                       13.6   17.43 )-----------( 486      ( 10 Nov 2020 19:30 )             47.6   11-10    142  |  101  |  15  ----------------------------<  138<H>  3.7   |  23  |  0.80    Ca    10.2      10 Nov 2020 19:30  Mg     1.9     11-10    TPro  7.9  /  Alb  4.1  /  TBili  0.3  /  DBili  x   /  AST  15  /  ALT  13  /  AlkPhos  70  11-10  PT/INR - ( 11 Nov 2020 01:15 )   PT: 15.0 SEC;   INR: 1.33          PTT - ( 11 Nov 2020 01:15 )  PTT:28.0 SEC    < from: CT Abdomen and Pelvis w/ IV Cont (11.11.20 @ 00:10) >      FINDINGS:  LOWER CHEST: 3 mm groundglass nodule in left lower lobe (series 2, image 14).    LIVER: Within normal limits.  BILE DUCTS: Normal caliber.  GALLBLADDER: Within normal limits.  SPLEEN: Within normal limits.  PANCREAS: Within normal limits.  ADRENALS: Within normal limits.  KIDNEYS/URETERS: Within normal limits.    BLADDER: Within normal limits.  REPRODUCTIVE ORGANS: Prostate within normal limits.    BOWEL: Moderate length segment bowel wall thickening and luminal narrowing with associated mesenteric hypervascularity involving a loop of distal jejunum/proximal ileumlocated in the left lower quadrant with 2 discontinuous areas of short segment bowel wall thickening and luminal narrowing in the small bowel just proximal to the area of moderate length segment bowel wall thickening with a resultant upstream small bowel obstruction. Appendix is normal.  PERITONEUM: Small amount of free pelvic fluid.  VESSELS: Within normal limits.  RETROPERITONEUM/LYMPH NODES: No lymphadenopathy. Prominent mesenteric lymph nodes measuring less than 1 cm in short axis which do not meet the size criteria for enlargement.  ABDOMINAL WALL: Within normal limits.  BONES: Within normal limits.    IMPRESSION:  Discontinuous areas of bowel wall thickening and luminal narrowing in the distal jejunum/proximal ileum located in the lowerabdomen compatible with foci of enteritis with possible stricturing likely correlating with history of Crohn's disease resulting in an upstream small bowel obstruction.

## 2020-11-11 NOTE — CHART NOTE - NSCHARTNOTEFT_GEN_A_CORE
CAPRINI SCORE    AGE RELATED RISK FACTORS                                                             [ ] Age 41-60 years                                            (1 Point)  [ ] Age: 61-74 years                                           (2 Points)                 [ ] Age= 75 years                                                (3 Points)             DISEASE RELATED RISK FACTORS                                                       [ ] Edema in the lower extremities                 (1 Point)                     [ ] Varicose veins                                               (1 Point)                                 [ ] BMI > 25 Kg/m2                                            (1 Point)                                  [ ] Serious infection (ie PNA)                            (1 Point)                     [ ] Lung disease ( COPD, Emphysema)            (1 Point)                                                                          [ ] Acute myocardial infarction                         (1 Point)                  [ ] Congestive heart failure (in the previous month)  (1 Point)         [x] Inflammatory bowel disease                            (1 Point)                  [ ] Central venous access, PICC or Port               (2 points)       (within the last month)                                                                [ ] Stroke (in the previous month)                        (5 Points)    [ ] Previous or present malignancy                       (2 points)                                                                                                                                                         HEMATOLOGY RELATED FACTORS                                                         [ ] Prior episodes of VTE                                     (3 Points)                     [ ] Positive family history for VTE                      (3 Points)                  [ ] Prothrombin 10492 A                                     (3 Points)                     [ ] Factor V Leiden                                                (3 Points)                        [ ] Lupus anticoagulants                                      (3 Points)                                                           [ ] Anticardiolipin antibodies                              (3 Points)                                                       [ ] High homocysteine in the blood                   (3 Points)                                             [ ] Other congenital or acquired thrombophilia      (3 Points)                                                [ ] Heparin induced thrombocytopenia                  (3 Points)                                        MOBILITY RELATED FACTORS  [ ] Bed rest                                                         (1 Point)  [ ] Plaster cast                                                    (2 points)  [ ] Bed bound for more than 72 hours           (2 Points)    GENDER SPECIFIC FACTORS  [ ] Pregnancy or had a baby within the last month   (1 Point)  [ ] Post-partum < 6 weeks                                   (1 Point)  [ ] Hormonal therapy  or oral contraception   (1 Point)  [ ] History of pregnancy complications              (1 point)  [ ] Unexplained or recurrent              (1 Point)    OTHER RISK FACTORS                                           (1 Point)  BMI >40, smoking, diabetes requiring insulin, chemotherapy  blood transfusions and length of surgery over 2 hours    SURGERY RELATED RISK FACTORS  [ ]  Section within the last month     (1 Point)  [ ] Minor surgery                                                  (1 Point)  [ ] Arthroscopic surgery                                       (2 Points)  [ ] Planned major surgery lasting more            (2 Points)      than 45 minutes     [ ] Elective hip or knee joint replacement       (5 points)       surgery                                                TRAUMA RELATED RISK FACTORS  [ ] Fracture of the hip, pelvis, or leg                       (5 Points)  [ ] Spinal cord injury resulting in paralysis             (5 points)       (in the previous month)    [ ] Paralysis  (less than 1 month)                             (5 Points)  [ ] Multiple Trauma within 1 month                        (5 Points)    Total Score [     1   ]    Caprini Score 0-2: Low Risk, NO VTE prophylaxis required for most patients, encourage ambulation  Caprini Score 3-6: Moderate Risk , pharmacologic VTE prophylaxis is indicated for most patients (in the absence of contraindications)  Caprini Score Greater than or =7: High risk, pharmacologic VTE prophylaxis indicated for most patients (in the absence of contraindications)

## 2020-11-11 NOTE — H&P ADULT - ATTENDING COMMENTS
Patient with crohns enteritis causing bowel obstruction.  plan  admit  npo, ngt  gi consult  transfer to colorectal service in am    I have personally interviewed and examined this patient, reviewed pertinent labs and imaging, and discussed the case with colleagues, residents, and physician assistants on B Team rounds.    The active care issues are:  1. crohn's flare    The Acute Care Surgery (B Team) Attending Group Practice:  Dr. Artie Mcclellan, Dr. John Godwin, Dr. Deny Scott, Dr. Oliver Ornelas,     urgent issues - spectra [id number]  nonurgent issues - [phone]  patient appointments or afterhours - [phone]

## 2020-11-11 NOTE — ED ADULT NURSE REASSESSMENT NOTE - NS ED NURSE REASSESS COMMENT FT1
report  given to floor RN. pt A&Ox3, ambulatory, offered no complains at present. NG tube noted in right nare, draining clear orange fluid into cannister approximately 300ml. respiration even and non-labored. 22G iv noted in lac, LR infusing no redness or swelling noted. pt awaiting for transport.

## 2020-11-11 NOTE — CONSULT NOTE ADULT - ASSESSMENT
25 Year-Old male recently diagnosed with Crohn's on budesonide, presenting with one day of severe abdominal pain, nausea/emesis.     pt with SIRS on presentation with leucocytosis and tachycardia due to crohn's flare.   Pt had quantiferon gold performed twice since his hospitalization at [facility] and both of them indeterminate due to lack of mitogen response.   Pt with documented negative PPD last year, no travel since then, no known exposure to anyone with TB.  Low risk pt, CXR with clear lungs     Plan:   no need for additional therapy right now  advised mother at bedside to bring the results of PPD   can initiate Remicade if needed, if possible would like to see the results of PPD prior.

## 2020-11-12 ENCOUNTER — TRANSCRIPTION ENCOUNTER (OUTPATIENT)
Age: 25
End: 2020-11-12

## 2020-11-12 LAB
ANION GAP SERPL CALC-SCNC: 10 MMO/L — SIGNIFICANT CHANGE UP (ref 7–14)
BUN SERPL-MCNC: 8 MG/DL — SIGNIFICANT CHANGE UP (ref 7–23)
CALCIUM SERPL-MCNC: 9.2 MG/DL — SIGNIFICANT CHANGE UP (ref 8.4–10.5)
CHLORIDE SERPL-SCNC: 103 MMOL/L — SIGNIFICANT CHANGE UP (ref 98–107)
CO2 SERPL-SCNC: 26 MMOL/L — SIGNIFICANT CHANGE UP (ref 22–31)
CREAT SERPL-MCNC: 0.65 MG/DL — SIGNIFICANT CHANGE UP (ref 0.5–1.3)
GLUCOSE SERPL-MCNC: 101 MG/DL — HIGH (ref 70–99)
HCT VFR BLD CALC: 37 % — LOW (ref 39–50)
HGB BLD-MCNC: 10.6 G/DL — LOW (ref 13–17)
MAGNESIUM SERPL-MCNC: 1.9 MG/DL — SIGNIFICANT CHANGE UP (ref 1.6–2.6)
MCHC RBC-ENTMCNC: 21.9 PG — LOW (ref 27–34)
MCHC RBC-ENTMCNC: 28.6 % — LOW (ref 32–36)
MCV RBC AUTO: 76.4 FL — LOW (ref 80–100)
NRBC # FLD: 0 K/UL — SIGNIFICANT CHANGE UP (ref 0–0)
PHOSPHATE SERPL-MCNC: 4.4 MG/DL — SIGNIFICANT CHANGE UP (ref 2.5–4.5)
PLATELET # BLD AUTO: 343 K/UL — SIGNIFICANT CHANGE UP (ref 150–400)
PMV BLD: 9.5 FL — SIGNIFICANT CHANGE UP (ref 7–13)
POTASSIUM SERPL-MCNC: 4.5 MMOL/L — SIGNIFICANT CHANGE UP (ref 3.5–5.3)
POTASSIUM SERPL-SCNC: 4.5 MMOL/L — SIGNIFICANT CHANGE UP (ref 3.5–5.3)
RBC # BLD: 4.84 M/UL — SIGNIFICANT CHANGE UP (ref 4.2–5.8)
RBC # FLD: 20.2 % — HIGH (ref 10.3–14.5)
SODIUM SERPL-SCNC: 139 MMOL/L — SIGNIFICANT CHANGE UP (ref 135–145)
WBC # BLD: 4.41 K/UL — SIGNIFICANT CHANGE UP (ref 3.8–10.5)
WBC # FLD AUTO: 4.41 K/UL — SIGNIFICANT CHANGE UP (ref 3.8–10.5)

## 2020-11-12 PROCEDURE — 99231 SBSQ HOSP IP/OBS SF/LOW 25: CPT

## 2020-11-12 PROCEDURE — 99232 SBSQ HOSP IP/OBS MODERATE 35: CPT

## 2020-11-12 PROCEDURE — 99232 SBSQ HOSP IP/OBS MODERATE 35: CPT | Mod: GC

## 2020-11-12 RX ORDER — INFLIXIMAB-DYYB 120 MG/ML
590 INJECTION SUBCUTANEOUS ONCE
Refills: 0 | Status: DISCONTINUED | OUTPATIENT
Start: 2020-11-12 | End: 2020-11-13

## 2020-11-12 RX ORDER — SODIUM CHLORIDE 9 MG/ML
1000 INJECTION, SOLUTION INTRAVENOUS
Refills: 0 | Status: DISCONTINUED | OUTPATIENT
Start: 2020-11-12 | End: 2020-11-13

## 2020-11-12 RX ADMIN — Medication 100 MILLIGRAM(S): at 13:51

## 2020-11-12 RX ADMIN — Medication 100 MILLIGRAM(S): at 05:58

## 2020-11-12 RX ADMIN — Medication 100 MILLIGRAM(S): at 21:14

## 2020-11-12 RX ADMIN — Medication 20 MILLIGRAM(S): at 21:13

## 2020-11-12 RX ADMIN — Medication 200 MILLIGRAM(S): at 05:58

## 2020-11-12 RX ADMIN — Medication 200 MILLIGRAM(S): at 18:50

## 2020-11-12 RX ADMIN — Medication 20 MILLIGRAM(S): at 05:57

## 2020-11-12 RX ADMIN — Medication 20 MILLIGRAM(S): at 13:51

## 2020-11-12 NOTE — DISCHARGE NOTE PROVIDER - CARE PROVIDERS DIRECT ADDRESSES
[email or web address],DirectAddress_Unknown

## 2020-11-12 NOTE — PROGRESS NOTE ADULT - ASSESSMENT
Patient is a 25 year old male recently diagnosed with Crohn's disease (on Budesonide) who presented with one day of severe abdominal pain, nausea and emesis.  CT scan obtained showing a short segment jejunal / ileal thickening causing small bowel obstruction.  Nasogastric tube placed in ED with 250cc clear yellow output.      PLAN:  - NGT removed last night  - Clear liquid diet  - Continue with IV Solu-Medrol 20mg q8 hours  - Continue with IV Flagyl and IV Ciprofloxacin  - Out of bed  - VTE prophylaxis with Lovenox subcutaneous      #[id number]  A Team Surgery

## 2020-11-12 NOTE — PROGRESS NOTE ADULT - ATTENDING COMMENTS
Grisel Mullins  Pager: [phone]. If no response or past 5 pm call [phone].     Will sign off, please call with questions.
Feels significantly better. NGT discontinued. No nausea or vomiting. Abdominal discomfort decreased and passing flatus. Recommendations as noted. Plan for initiation of Remicade therapy 10 mg/milligram. If improvement persists can switch from Solu-Medrol to prednisone 40 mg daily on 11/13/2020. Advance diet per surgery service.
crohn flare  -Abx  -iv steroids  -clears, advance to LRD w/ BM  -OOB  -dvt ppx

## 2020-11-12 NOTE — PROGRESS NOTE ADULT - ASSESSMENT
24yo M with recently diagnosed Crohn's who presents with abdominal pain x 1d, found to have SBO in settings of likely Crohn's stricture.    # Likely Crohn's flare - Severe disease given stricturing and SBO. Currently on budesonide. Patient would benefit from initiation of induction therapy (i.e Infliximab), plus a course of IV steroids for a more immediate relief. Patient has documented negative HBV serology and indeterminate QuantiFeron on previous labs done at [facility]. Discussed with patient's GI doctor Dr. Griffiths, agrees with steroids and Remicade.  # SBO - improving with conservative measures.    Recommendations:  - ID recs appreciated  - PPD results reviewed (multiple prior negative PPDs - last done 2019, with no travel since)  - Remicade 10mg/kg (590mg) ordered by GI fellow  - Next dose to be given as outpatient at [facility] (discussed with his GI doctor at [facility], Dr. Griffiths)  - c/w solumedrol 20mg q8h  - Please repeat HBV serologies and QuantiFeron in house  - SBO management per surgical team  - CRP, ESR q48h  - CBC, CMP, INR daily  - Patient is at a high risk for VTE, please continue LMWH    Thank you for involving us in the care of this patient. Please reach out if any further questions.    Vaughn Cote, PGY-4  GI Fellow    Available on [employer] Teams  Pager [phone] ([facility]) or [phone] ([facility])  After 5PM/Weekends, please contact the on-call GI fellow: [phone]  Available through [employer] Teams

## 2020-11-12 NOTE — DISCHARGE NOTE PROVIDER - PROVIDER TOKENS
PROVIDER:[TOKEN:[[id number]],FOLLOWUP:[1 week]],PROVIDER:[TOKEN:[[id number]],FOLLOWUP:[1 week]],FREE:[LAST:[Dr. Griffiths],FIRST:[Gastroenterologist],PHONE:[(   )    -],FAX:[(   )    -],ADDRESS:[[facility]],FOLLOWUP:[1 week]] PROVIDER:[TOKEN:[[id number]],FOLLOWUP:[1 week]],PROVIDER:[TOKEN:[[id number]],FOLLOWUP:[1 week]],FREE:[LAST:[Griffiths],FIRST:[Alfie],PHONE:[(   )    -],FAX:[(   )    -],ADDRESS:[Gastroenterologist],ESTABLISHEDPATIENT:[T]]

## 2020-11-12 NOTE — PROGRESS NOTE ADULT - ASSESSMENT
25 Year-Old male recently diagnosed with Crohn's on budesonide, presenting with one day of severe abdominal pain, nausea/emesis.     pt with SIRS on presentation with leucocytosis and tachycardia due to crohn's flare.   Pt had quantiferon gold performed twice since his hospitalization at [facility] and both of them indeterminate due to lack of mitogen response.   Pt with multiple negative PPD in past, no travel since then, no known exposure to anyone with TB.  Low risk pt, CXR with clear lungs     Plan:   no need for additional therapy right now  advised mother at bedside to bring the results of PPD   can initiate Remicade if needed, if possible would like to see the results of PPD prior.   pt advised to look out for symptoms of TB and seek medical attention right away. He understands and verbalizes.

## 2020-11-12 NOTE — DISCHARGE NOTE PROVIDER - HOSPITAL COURSE
Patient is a 25 year old male recently diagnosed with Crohn's disease (on Budesonide) who presented with one day of severe abdominal pain, nausea and emesis.  CT scan obtained showing a short segment jejunal / ileal thickening causing small bowel obstruction.  Nasogastric tube placed in ED with 250cc clear yellow output.    On 11/11 patient passed clamp trial.  NGT was removed and patient was started on a clear liquid diet, which he tolerated well.  He was started on IV Solu-Medrol per GI.    On 11/12 patient remained on clear liquid diet.  He was given a dose of Remicade inpatient.      ***COMPLETE UP TO 11/12*** Patient is a 25 year old male recently diagnosed with Crohn's disease (on Budesonide) who presented to [facility] 11/11/20 with one day of severe abdominal pain, nausea and emesis.     Patient is a 25 Year-Old Gentleman recently diagnosed with Crohn's (on Budesonide), who presented to [facility] 11/11/20 with one day of severe abdominal pain, nausea and emesis. He initially presented last month to [facility] with similar symptoms of abdominal pain, nausea/emesis, had workup done including MR Enterography, Push Enteroscopy with biopsy consistent with Crohn's. He was started on budesonide with plan to transition to Remicade. However since evening prior to admission, had one episode of abdominal pain and emesis that resolved. Was able to tolerate oral intake earlier yesterday day, however in evening developed severe symptoms of pain, nausea and emesis, so he presented to the hospital. Has not passed gas or had bowel movement since onset of symptoms.     On evaluation in the Emergency Department, vitals were notable for tachycardia to 100s. Physical exam notable for L sided abdominal tenderness to palpation. Labs drawn, notable for WBC 17.4.     Admission CT abd/pelvis (11/11): Discontinuous areas of bowel wall thickening and luminal narrowing in the distal jejunum/proximal ileum located in the lower abdomen compatible with foci of enteritis with possible stricturing likely correlating with history of Crohn's disease resulting in an upstream small bowel obstruction.    Nasogastric tube placed in ED with 250cc clear yellow output. Patient was admitted to the surgical service and started on IV antibiotics.     On 11/11 patient passed clamp trial.  NGT was removed and patient was started on a clear liquid diet, which he tolerated well.  He was started on IV Solu-Medrol per GI.  Patient was cleared by infectious disease to commence biologic agents given "indeterminate" QuantiFeron result from OSH.     11/12: patient remained on clear liquid diet.  He was given a dose of Remicade inpatient.     11/13: Diet was advanced to low fiber diet with good tolerance.     Pt currently ambulating, voiding, tolerating a regular diet. Patient is stable for discharge home to follow up as an outpatient, instructed to call to schedule appointment.    Patient is a 25 year old male recently diagnosed with Crohn's disease (on Budesonide) who presented to [facility] 11/11/20 with one day of severe abdominal pain, nausea and emesis.     Patient is a 25 Year-Old Gentleman recently diagnosed with Crohn's (on Budesonide), who presented to [facility] 11/11/20 with one day of severe abdominal pain, nausea and emesis. He initially presented last month to [facility] with similar symptoms of abdominal pain, nausea/emesis, had workup done including MR Enterography, Push Enteroscopy with biopsy consistent with Crohn's. He was started on budesonide with plan to transition to Remicade. However since evening prior to admission, had one episode of abdominal pain and emesis that resolved. Was able to tolerate oral intake earlier yesterday day, however in evening developed severe symptoms of pain, nausea and emesis, so he presented to the hospital. Has not passed gas or had bowel movement since onset of symptoms.     On evaluation in the Emergency Department, vitals were notable for tachycardia to 100s. Physical exam notable for L sided abdominal tenderness to palpation. Labs drawn, notable for WBC 17.4.     Admission CT abd/pelvis (11/11): Discontinuous areas of bowel wall thickening and luminal narrowing in the distal jejunum/proximal ileum located in the lower abdomen compatible with foci of enteritis with possible stricturing likely correlating with history of Crohn's disease resulting in an upstream small bowel obstruction.    Nasogastric tube placed in ED with 250cc clear yellow output. Patient was admitted to the surgical service and started on IV antibiotics.     On 11/11 patient passed clamp trial.  NGT was removed and patient was started on a clear liquid diet, which he tolerated well.  He was started on IV Solu-Medrol per GI.  Patient was cleared by infectious disease to commence biologic agents given "indeterminate" QuantiFeron result from OSH.     11/12: patient remained on clear liquid diet.  He was given a dose of inflectra inpatient.     11/13: Diet was advanced to low fiber diet with good tolerance.     Pt currently ambulating, voiding, tolerating a regular diet. Patient is stable for discharge home to follow up as an outpatient, instructed to call to schedule appointment. Patient had an appointment with Dr Griffiths next Tuesday.

## 2020-11-12 NOTE — PROGRESS NOTE ADULT - SUBJECTIVE AND OBJECTIVE BOX
25y old  Male who presents with a chief complaint of Small bowel obstruction due to Crohn's stricture (12 Nov 2020 15:26)      Interval history:  Afebrile, pt denies any travel in last 8 years, has had PPD in past multiple times which were negative, no cough.       Allergies:   No Known Allergies      Antimicrobials:  ciprofloxacin   IVPB 400 milliGRAM(s) IV Intermittent every 12 hours  metroNIDAZOLE  IVPB 500 milliGRAM(s) IV Intermittent every 8 hours      REVIEW OF SYSTEMS:  No chest pain   No SOB  No dysuria   No rash.       Vital Signs Last 24 Hrs  T(C): 36.6 (11-12-20 @ 14:30), Max: 36.9 (11-11-20 @ 17:30)  T(F): 97.8 (11-12-20 @ 14:30), Max: 98.4 (11-11-20 @ 17:30)  HR: 82 (11-12-20 @ 14:30) (79 - 98)  BP: 110/68 (11-12-20 @ 14:30) (108/61 - 116/64)  BP(mean): --  RR: 16 (11-12-20 @ 14:30) (16 - 18)  SpO2: 98% (11-12-20 @ 14:30) (93% - 100%)      PHYSICAL EXAM:  Patient in no acute distress. AAOX3.  + ng   No icterus, no oral ulcers.  Cardiovascular: S1S2 normal.  Lungs: Good air entry B/L lung fields.  Gastrointestinal: soft, nontender, nondistended.  Extremities: no edema.  IV sites not inflamed.                           10.6   4.41  )-----------( 343      ( 12 Nov 2020 06:15 )             37.0   11-12    139  |  103  |  8   ----------------------------<  101<H>  4.5   |  26  |  0.65    Ca    9.2      12 Nov 2020 06:15  Phos  4.4     11-12  Mg     1.9     11-12    TPro  5.5<L>  /  Alb  3.2<L>  /  TBili  0.3  /  DBili  < 0.2  /  AST  10  /  ALT  12  /  AlkPhos  56  11-11      LIVER FUNCTIONS - ( 11 Nov 2020 04:50 )  Alb: 3.2 g/dL / Pro: 5.5 g/dL / ALK PHOS: 56 u/L / ALT: 12 u/L / AST: 10 u/L / GGT: x

## 2020-11-12 NOTE — PROGRESS NOTE ADULT - SUBJECTIVE AND OBJECTIVE BOX
Surgery Daily Progress Note  =====================================================  Interval / Overnight Events: Passed NGT clamp trial overnight and advanced to clear liquid diet.      HPI:  Patient is a 25 year old male recently diagnosed with Crohn's disease (on Budesonide) who presented with one day of severe abdominal pain, nausea and emesis.  He initially presented last month to [facility] with similar symptoms - had workup done including MR Enterography, Push Enteroscopy with biopsy consistent with Crohn's.  He was started on Budesonide with plan to transition to Remicade.  Since day prior to admission, he had one episode of abdominal pain and emesis that resolved.  Was able to tolerate PO intake earlier in day, however in evening developed severe pain, nausea and emesis.    On evaluation in the Emergency Department, vitals were notable for tachycardia to 100s.  Physical exam notable for left sided abdominal tenderness to palpation.  Labs drawn, notable for WBC 17.4.  CT scan obtained showing a short segment jejunal / ileal thickening causing small bowel obstruction.  Nasogastric tube placed in ED with 250cc clear yellow output.  (11 Nov 2020 03:45)      PAST MEDICAL & SURGICAL HISTORY:  Crohn disease  No significant past surgical history      ALLERGIES:  No Known Allergies    --------------------------------------------------------------------------------------    MEDICATIONS:    Gastrointestinal Medications  lactated ringers. 1000 milliLiter(s) IV Continuous <Continuous>    Hematologic/Oncologic Medications  enoxaparin Injectable 40 milliGRAM(s) SubCutaneous daily    Antimicrobial/Immunologic Medications  ciprofloxacin   IVPB 400 milliGRAM(s) IV Intermittent every 12 hours  metroNIDAZOLE  IVPB 500 milliGRAM(s) IV Intermittent every 8 hours    Endocrine/Metabolic Medications  methylPREDNISolone sodium succinate Injectable 20 milliGRAM(s) IV Push every 8 hours    Topical/Other Medications  benzocaine 15 mG/menthol 3.6 mG (Sugar-Free) Lozenge 1 Lozenge Oral every 3 hours PRN ngt  benzocaine 15 mG/menthol 3.6 mG (Sugar-Free) Lozenge 1 Lozenge Oral once  tetracaine/benzocaine/butamben Spray 1 Spray(s) Topical every 3 hours PRN ngt    --------------------------------------------------------------------------------------    VITAL SIGNS:  T(C): 36.4 (12 Nov 2020 06:01), Max: 36.9 (11 Nov 2020 17:30)  T(F): 97.5 (12 Nov 2020 06:01), Max: 98.4 (11 Nov 2020 17:30)  HR: 79 (12 Nov 2020 06:01) (79 - 98)  BP: 108/61 (12 Nov 2020 06:01) (108/61 - 116/64)  RR: 18 (12 Nov 2020 06:01) (16 - 18)  SpO2: 100% (12 Nov 2020 06:01) (93% - 100%)    --------------------------------------------------------------------------------------    INS AND OUTS:    11 Nov 2020 07:01  -  12 Nov 2020 07:00  --------------------------------------------------------  IN:    Lactated Ringers: 2750 mL    Oral Fluid: 390 mL  Total IN: 3140 mL    OUT:    Nasogastric/Oral tube (mL): 125 mL    Voided (mL): 2200 mL  Total OUT: 2325 mL    Total NET: 815 mL    --------------------------------------------------------------------------------------    EXAM    NEUROLOGY  Exam: Normal, in no acute distress.    HEENT  Exam: Normocephalic, atraumatic.    RESPIRATORY  Exam: Normal expansion / effort.    CARDIOVASCULAR  Exam: S1, S2.  Regular rate and rhythm.    GI/NUTRITION  Exam: Abdomen soft, Non-tender, Non-distended.  Current Diet: Clear liquid diet    MUSCULOSKELETAL  Exam: All extremities moving spontaneously without limitations.      METABOLIC / FLUIDS / ELECTROLYTES  lactated ringers. 1000 milliLiter(s) IV Continuous <Continuous>      HEMATOLOGIC  [x] VTE Prophylaxis: enoxaparin Injectable 40 milliGRAM(s) SubCutaneous daily      INFECTIOUS DISEASE  Antimicrobials/Immunologic Medications:  ciprofloxacin   IVPB 400 milliGRAM(s) IV Intermittent every 12 hours  metroNIDAZOLE  IVPB 500 milliGRAM(s) IV Intermittent every 8 hours    --------------------------------------------------------------------------------------

## 2020-11-12 NOTE — PROGRESS NOTE ADULT - SUBJECTIVE AND OBJECTIVE BOX
Chief Complaint:  Patient is a 25y old  Male who presents with a chief complaint of Small bowel obstruction due to Crohn's stricture (12 Nov 2020 07:51)      Interval Events:   - NGT clamped and removed  - Passing flatus  - No BM yet  - Tolerating CLD    Allergies:  No Known Allergies      Hospital Medications:  benzocaine 15 mG/menthol 3.6 mG (Sugar-Free) Lozenge 1 Lozenge Oral every 3 hours PRN  ciprofloxacin   IVPB 400 milliGRAM(s) IV Intermittent every 12 hours  enoxaparin Injectable 40 milliGRAM(s) SubCutaneous daily  inFLIXimab (REMICADE) IVPB 590 milliGRAM(s) IV Intermittent once  lactated ringers. 1000 milliLiter(s) IV Continuous <Continuous>  methylPREDNISolone sodium succinate Injectable 20 milliGRAM(s) IV Push every 8 hours  metroNIDAZOLE  IVPB 500 milliGRAM(s) IV Intermittent every 8 hours  tetracaine/benzocaine/butamben Spray 1 Spray(s) Topical every 3 hours PRN      PMHX/PSHX:  Crohn disease    No significant past surgical history        Family history:      ROS:   General:  No wt loss, fevers, chills, night sweats, fatigue,   Eyes:  Good vision, no reported pain  ENT:  No sore throat, pain, runny nose, dysphagia  CV:  No pain, palpitations, hypo/hypertension  Pulm:  No dyspnea, cough, tachypnea, wheezing  GI:  As above  :  No pain, bleeding, incontinence, nocturia  Muscle:  No pain, weakness  Neuro:  No weakness, tingling, memory problems  Psych:  No fatigue, insomnia, mood problems, depression  Endocrine:  No polyuria, polydipsia, cold/heat intolerance  Heme:  No petechiae, ecchymosis, easy bruisability  Skin:  No rash, tattoos, scars, edema      PHYSICAL EXAM:   Vital Signs:  Vital Signs Last 24 Hrs  T(C): 36.3 (12 Nov 2020 10:25), Max: 36.9 (11 Nov 2020 17:30)  T(F): 97.3 (12 Nov 2020 10:25), Max: 98.4 (11 Nov 2020 17:30)  HR: 80 (12 Nov 2020 10:25) (79 - 98)  BP: 108/70 (12 Nov 2020 10:25) (108/61 - 116/64)  BP(mean): --  RR: 18 (12 Nov 2020 10:25) (17 - 18)  SpO2: 98% (12 Nov 2020 10:25) (93% - 100%)  Daily     Daily     GENERAL:  No acute distress  HEENT:  Normocephalic/atraumatic, no scleral icterus  CHEST:  Clear to auscultation bilaterally, no wheezes/rales/ronchi, no accessory muscle use  HEART:  Regular rate and rhythm, no murmurs/rubs/gallops  ABDOMEN:  Soft, ttp in LLQ, no rebound or guarding, non-distended, no masses, no hepato-splenomegaly, no signs of chronic liver disease  EXTREMITIES: No cyanosis, clubbing, or edema  SKIN:  No rash/erythema/ecchymoses/petechiae/wounds/abscess/warm/dry  NEURO:  Alert and oriented x 3, no asterixis      LABS:                        10.6   4.41  )-----------( 343      ( 12 Nov 2020 06:15 )             37.0     Mean Cell Volume: 76.4 fL (11-12-20 @ 06:15)    11-12    139  |  103  |  8   ----------------------------<  101<H>  4.5   |  26  |  0.65    Ca    9.2      12 Nov 2020 06:15  Phos  4.4     11-12  Mg     1.9     11-12    TPro  5.5<L>  /  Alb  3.2<L>  /  TBili  0.3  /  DBili  < 0.2  /  AST  10  /  ALT  12  /  AlkPhos  56  11-11    LIVER FUNCTIONS - ( 11 Nov 2020 04:50 )  Alb: 3.2 g/dL / Pro: 5.5 g/dL / ALK PHOS: 56 u/L / ALT: 12 u/L / AST: 10 u/L / GGT: x           PT/INR - ( 11 Nov 2020 01:15 )   PT: 15.0 SEC;   INR: 1.33          PTT - ( 11 Nov 2020 01:15 )  PTT:28.0 SEC                            10.6   4.41  )-----------( 343      ( 12 Nov 2020 06:15 )             37.0                         10.8   9.39  )-----------( 370      ( 11 Nov 2020 04:50 )             37.8                         13.6   17.43 )-----------( 486      ( 10 Nov 2020 19:30 )             47.6       Imaging:

## 2020-11-12 NOTE — DISCHARGE NOTE PROVIDER - NSDCCPCAREPLAN_GEN_ALL_CORE_FT
PRINCIPAL DISCHARGE DIAGNOSIS  Diagnosis: Small bowel obstruction  Assessment and Plan of Treatment:        PRINCIPAL DISCHARGE DIAGNOSIS  Diagnosis: Small bowel obstruction  Assessment and Plan of Treatment: You were admitted to obstruction secondary to Crohn's stricture/flare.  When you left the hospital you were pain free and without any signs of active infection (no fever/chills and no elevated white blood cell count).  You are being discharged from the hospital with a prescription (e prescribe) for Ciprofloxicin and Flagyl (two antibiotics which treat diverticulitis).  Please finish your prescription.  If you have any abdominal pain, fever, chills, nausea or vomiting, please call Dr. Norris's office.  You should eat a low fiber diet during the acute event and then return to a high fiber diet in about 2 more weeks (as long as you have no further symptoms).  Please call Dr. Norris's office to make an appointment in 1 week. You may slowly resume your pre-hospital physical activity as long as it doesn't cause you pain or dizziness.       PRINCIPAL DISCHARGE DIAGNOSIS  Diagnosis: Small bowel obstruction  Assessment and Plan of Treatment: You were admitted to obstruction secondary to Crohn's stricture/flare. You were started on Cipro and flagyl and were given a dose of inflectra 590 mg on 11/13   When you left the hospital you were pain free and without any signs of active infection (no fever/chills and no elevated white blood cell count).  You are being discharged from the hospital with a prescription (e prescribe) for Ciprofloxicin and Flagyl (two antibiotics which treat diverticulitis).  Please finish your prescription. GI wants to you take 40 mg of prednisone daily. You had 40 mg on 11/13, start  your prescription on 11/14. If you have any abdominal pain, fever, chills, nausea or vomiting, please call Dr. Norris's office.  You should eat a low fiber diet during the acute event and then return to a high fiber diet in about 2 more weeks (as long as you have no further symptoms).  Please call Dr. Norris's office to make an appointment in 1 week. You may slowly resume your pre-hospital physical activity as long as it doesn't cause you pain or dizziness.

## 2020-11-12 NOTE — DISCHARGE NOTE PROVIDER - CARE PROVIDER_API CALL
Jose Norris  COLON/RECTAL SURGERY  [facility], [address] [place] [address]  Phone: [phone]  Fax: [phone]  Follow Up Time: 1 week    John Spaulding)  Gastroenterology; Internal Medicine  [address] [facility], [address]  [place] [address]  Phone: [phone]  Fax: [phone]  Follow Up Time: 1 week    Dr. Griffiths, Gastroenterologist  [facility]  Phone: (   )    -  Fax: (   )    -  Follow Up Time: 1 week   Jose Norris  COLON/RECTAL SURGERY  [facility], [address] [place] [address]  Phone: [phone]  Fax: [phone]  Follow Up Time: 1 week    John Spaulding)  Gastroenterology; Internal Medicine  [address] [facility], [address]  [place] [address]  Phone: [phone]  Fax: [phone]  Follow Up Time: 1 week    Alfie Griffiths  Gastroenterologist  Phone: (   )    -  Fax: (   )    -  Established Patient  Follow Up Time:

## 2020-11-12 NOTE — DISCHARGE NOTE PROVIDER - NSDCMRMEDTOKEN_GEN_ALL_CORE_FT
budesonide 9 mg oral tablet, extended release: 1 tab(s) orally once a day (in the morning)  cyanocobalamin 100 mcg oral tablet: 1 tab(s) orally once a day  folic acid 1 mg oral tablet: 1 tab(s) orally once a day  Vitamin D3 2000 intl units (50 mcg) oral tablet: 1 tab(s) orally once a day   budesonide 9 mg oral tablet, extended release: 1 tab(s) orally once a day (in the morning)  ciprofloxacin 500 mg oral tablet: 1 tab(s) orally every 12 hours  cyanocobalamin 100 mcg oral tablet: 1 tab(s) orally once a day  Flagyl 500 mg oral tablet: 1 tab(s) orally every 8 hours   folic acid 1 mg oral tablet: 1 tab(s) orally once a day  Vitamin D3 2000 intl units (50 mcg) oral tablet: 1 tab(s) orally once a day   ciprofloxacin 500 mg oral tablet: 1 tab(s) orally every 12 hours  cyanocobalamin 100 mcg oral tablet: 1 tab(s) orally once a day  Flagyl 500 mg oral tablet: 1 tab(s) orally every 8 hours   folic acid 1 mg oral tablet: 1 tab(s) orally once a day  predniSONE 10 mg oral tablet: 4 tab(s) orally once a day x 7 days  3 tab(s) orally once a day x 7 days  2 tab(s) orally once a day x 7 days  1 tab(s) orally once a day x 7 days MDD:1  Vitamin D3 2000 intl units (50 mcg) oral tablet: 1 tab(s) orally once a day

## 2020-11-12 NOTE — DISCHARGE NOTE PROVIDER - NSDCFUADDAPPT_GEN_ALL_CORE_FT
Please call Dr. Norris's office to make an appointment in 1 week.    Please call Dr. Griffiths's office (Gastroenterologist) within 1 week following discharge for continued management and future Remicade dosing.

## 2020-11-13 ENCOUNTER — TRANSCRIPTION ENCOUNTER (OUTPATIENT)
Age: 25
End: 2020-11-13

## 2020-11-13 VITALS
OXYGEN SATURATION: 98 % | RESPIRATION RATE: 18 BRPM | DIASTOLIC BLOOD PRESSURE: 60 MMHG | TEMPERATURE: 98 F | SYSTOLIC BLOOD PRESSURE: 110 MMHG | HEART RATE: 68 BPM

## 2020-11-13 LAB
ALBUMIN SERPL ELPH-MCNC: 3.1 G/DL — LOW (ref 3.3–5)
ALP SERPL-CCNC: 46 U/L — SIGNIFICANT CHANGE UP (ref 40–120)
ALT FLD-CCNC: 11 U/L — SIGNIFICANT CHANGE UP (ref 4–41)
ANION GAP SERPL CALC-SCNC: 8 MMO/L — SIGNIFICANT CHANGE UP (ref 7–14)
AST SERPL-CCNC: 9 U/L — SIGNIFICANT CHANGE UP (ref 4–40)
BILIRUB SERPL-MCNC: < 0.2 MG/DL — LOW (ref 0.2–1.2)
BUN SERPL-MCNC: 6 MG/DL — LOW (ref 7–23)
CALCIUM SERPL-MCNC: 9.5 MG/DL — SIGNIFICANT CHANGE UP (ref 8.4–10.5)
CHLORIDE SERPL-SCNC: 104 MMOL/L — SIGNIFICANT CHANGE UP (ref 98–107)
CO2 SERPL-SCNC: 27 MMOL/L — SIGNIFICANT CHANGE UP (ref 22–31)
CREAT SERPL-MCNC: 0.68 MG/DL — SIGNIFICANT CHANGE UP (ref 0.5–1.3)
GLUCOSE SERPL-MCNC: 109 MG/DL — HIGH (ref 70–99)
HCT VFR BLD CALC: 35.6 % — LOW (ref 39–50)
HGB BLD-MCNC: 10.4 G/DL — LOW (ref 13–17)
MAGNESIUM SERPL-MCNC: 1.9 MG/DL — SIGNIFICANT CHANGE UP (ref 1.6–2.6)
MCHC RBC-ENTMCNC: 21.8 PG — LOW (ref 27–34)
MCHC RBC-ENTMCNC: 29.2 % — LOW (ref 32–36)
MCV RBC AUTO: 74.8 FL — LOW (ref 80–100)
NRBC # FLD: 0 K/UL — SIGNIFICANT CHANGE UP (ref 0–0)
PHOSPHATE SERPL-MCNC: 5.1 MG/DL — HIGH (ref 2.5–4.5)
PLATELET # BLD AUTO: 380 K/UL — SIGNIFICANT CHANGE UP (ref 150–400)
PMV BLD: 9.7 FL — SIGNIFICANT CHANGE UP (ref 7–13)
POTASSIUM SERPL-MCNC: 4.2 MMOL/L — SIGNIFICANT CHANGE UP (ref 3.5–5.3)
POTASSIUM SERPL-SCNC: 4.2 MMOL/L — SIGNIFICANT CHANGE UP (ref 3.5–5.3)
PROT SERPL-MCNC: 6 G/DL — SIGNIFICANT CHANGE UP (ref 6–8.3)
RBC # BLD: 4.76 M/UL — SIGNIFICANT CHANGE UP (ref 4.2–5.8)
RBC # FLD: 20.2 % — HIGH (ref 10.3–14.5)
SODIUM SERPL-SCNC: 139 MMOL/L — SIGNIFICANT CHANGE UP (ref 135–145)
WBC # BLD: 7.62 K/UL — SIGNIFICANT CHANGE UP (ref 3.8–10.5)
WBC # FLD AUTO: 7.62 K/UL — SIGNIFICANT CHANGE UP (ref 3.8–10.5)

## 2020-11-13 RX ORDER — BUDESONIDE, MICRONIZED 100 %
1 POWDER (GRAM) MISCELLANEOUS
Qty: 0 | Refills: 0 | DISCHARGE

## 2020-11-13 RX ORDER — INFLIXIMAB-DYYB 120 MG/ML
590 INJECTION SUBCUTANEOUS ONCE
Refills: 0 | Status: COMPLETED | OUTPATIENT
Start: 2020-11-13 | End: 2020-11-13

## 2020-11-13 RX ORDER — METRONIDAZOLE 500 MG
1 TABLET ORAL
Qty: 42 | Refills: 0
Start: 2020-11-13 | End: 2020-11-26

## 2020-11-13 RX ORDER — CIPROFLOXACIN LACTATE 400MG/40ML
1 VIAL (ML) INTRAVENOUS
Qty: 28 | Refills: 0
Start: 2020-11-13 | End: 2020-11-26

## 2020-11-13 RX ADMIN — INFLIXIMAB-DYYB 125 MILLIGRAM(S): 120 INJECTION SUBCUTANEOUS at 10:13

## 2020-11-13 RX ADMIN — Medication 40 MILLIGRAM(S): at 17:13

## 2020-11-13 RX ADMIN — ENOXAPARIN SODIUM 40 MILLIGRAM(S): 100 INJECTION SUBCUTANEOUS at 14:35

## 2020-11-13 RX ADMIN — Medication 20 MILLIGRAM(S): at 06:17

## 2020-11-13 RX ADMIN — SODIUM CHLORIDE 75 MILLILITER(S): 9 INJECTION, SOLUTION INTRAVENOUS at 06:18

## 2020-11-13 RX ADMIN — Medication 200 MILLIGRAM(S): at 07:47

## 2020-11-13 RX ADMIN — Medication 100 MILLIGRAM(S): at 14:35

## 2020-11-13 RX ADMIN — Medication 100 MILLIGRAM(S): at 06:18

## 2020-11-13 NOTE — PROGRESS NOTE ADULT - SUBJECTIVE AND OBJECTIVE BOX
Chief Complaint:  Patient is a 25y old  Male who presents with a chief complaint of Small bowel obstruction due to Crohn's stricture (13 Nov 2020 07:52)      Interval Events:   - Due to issues with pharmacy Remicade was not given, Inflectra to be administered today  - Patient tolerated CLD well, no BM yet but passing gas    Allergies:  No Known Allergies      Hospital Medications:  ciprofloxacin   IVPB 400 milliGRAM(s) IV Intermittent every 12 hours  enoxaparin Injectable 40 milliGRAM(s) SubCutaneous daily  inFLIXimab-dyyb (INFLECTRA) IVPB 590 milliGRAM(s) IV Intermittent once  metroNIDAZOLE  IVPB 500 milliGRAM(s) IV Intermittent every 8 hours  predniSONE   Tablet   Oral   predniSONE   Tablet 40 milliGRAM(s) Oral daily      PMHX/PSHX:  Crohn disease    No significant past surgical history        Family history:      ROS:   General:  No wt loss, fevers, chills, night sweats, fatigue,   Eyes:  Good vision, no reported pain  ENT:  No sore throat, pain, runny nose, dysphagia  CV:  No pain, palpitations, hypo/hypertension  Pulm:  No dyspnea, cough, tachypnea, wheezing  GI:  As above  :  No pain, bleeding, incontinence, nocturia  Muscle:  No pain, weakness  Neuro:  No weakness, tingling, memory problems  Psych:  No fatigue, insomnia, mood problems, depression  Endocrine:  No polyuria, polydipsia, cold/heat intolerance  Heme:  No petechiae, ecchymosis, easy bruisability  Skin:  No rash, tattoos, scars, edema      PHYSICAL EXAM:   Vital Signs:  Vital Signs Last 24 Hrs  T(C): 36.3 (13 Nov 2020 06:43), Max: 36.6 (12 Nov 2020 14:30)  T(F): 97.3 (13 Nov 2020 06:43), Max: 97.8 (12 Nov 2020 14:30)  HR: 59 (13 Nov 2020 06:43) (59 - 82)  BP: 104/62 (13 Nov 2020 06:43) (100/57 - 110/68)  BP(mean): --  RR: 17 (13 Nov 2020 06:43) (16 - 18)  SpO2: 99% (13 Nov 2020 06:43) (98% - 99%)  Daily     Daily     GENERAL:  No acute distress  HEENT:  Normocephalic/atraumatic, no scleral icterus  CHEST:  Clear to auscultation bilaterally, no wheezes/rales/ronchi, no accessory muscle use  HEART:  Regular rate and rhythm, no murmurs/rubs/gallops  ABDOMEN:  Soft, ttp in LLQ, no rebound or guarding, non-distended, no masses, no hepato-splenomegaly, no signs of chronic liver disease  EXTREMITIES: No cyanosis, clubbing, or edema  SKIN:  No rash/erythema/ecchymoses/petechiae/wounds/abscess/warm/dry  NEURO:  Alert and oriented x 3, no asterixis      LABS:                        10.4   7.62  )-----------( 380      ( 13 Nov 2020 05:50 )             35.6     Mean Cell Volume: 74.8 fL (11-13-20 @ 05:50)    11-13    139  |  104  |  6<L>  ----------------------------<  109<H>  4.2   |  27  |  0.68    Ca    9.5      13 Nov 2020 05:50  Phos  5.1     11-13  Mg     1.9     11-13    TPro  6.0  /  Alb  3.1<L>  /  TBili  < 0.2<L>  /  DBili  x   /  AST  9   /  ALT  11  /  AlkPhos  46  11-13    LIVER FUNCTIONS - ( 13 Nov 2020 05:50 )  Alb: 3.1 g/dL / Pro: 6.0 g/dL / ALK PHOS: 46 u/L / ALT: 11 u/L / AST: 9 u/L / GGT: x                                       10.4   7.62  )-----------( 380      ( 13 Nov 2020 05:50 )             35.6                         10.6   4.41  )-----------( 343      ( 12 Nov 2020 06:15 )             37.0                         10.8   9.39  )-----------( 370      ( 11 Nov 2020 04:50 )             37.8                         13.6   17.43 )-----------( 486      ( 10 Nov 2020 19:30 )             47.6       Imaging:

## 2020-11-13 NOTE — PROGRESS NOTE ADULT - REASON FOR ADMISSION
Small bowel obstruction due to Crohn's stricture

## 2020-11-13 NOTE — DIETITIAN INITIAL EVALUATION ADULT. - CHIEF COMPLAINT
The patient is a 25y Male admitted for small bowel obstruction. PMHx significant for Crohn's disease.

## 2020-11-13 NOTE — DIETITIAN INITIAL EVALUATION ADULT. - ORAL INTAKE PTA/DIET HISTORY
Patient reports following GI doctor recommendations for a low fiber diet PTA. States that he has omitted nuts and popcorn, and has been keeping a food journal to assess for intolerance triggers. Patient typically cooks meals at home and eats out approximately once every 2 weeks. Patient reports attempting to eat regular meals with protein.

## 2020-11-13 NOTE — DIETITIAN INITIAL EVALUATION ADULT. - OTHER INFO
Patient consulted for nutrition services and seen by dietetic intern under supervision. Patient reports 20lbs weight loss in the past 4-5 months; however, has been losing weight for over a year. Patient states that the weight loss in additional to abdominal pain prompted him to seek medical care one month ago, when he was diagnosed with Crohn's. Patient states that his usual body weight prior to weight loss is 165lbs and his current stated weight is 125lbs. His documented weight is 130lbs. Patient reports decreasing appetite; however, attempts to eat 3 meals and a snack per day. 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 Patient began passing gas this admission, per chart, patient's last bowel movement was on 11/9 with no further BMs this admission. Patient reports no nausea/vomiting at this time and no food allergies. Patient states that he takes the following vitamins/minerals per doctor recommendation: B12, vitamin D, folic acid and iron.   Patient has been previously educated on a low-fiber diet as well as a FODMAP diet. Patient states that his usual intake is low in FODMAPs and he is removing the skins from apples and cooking his fruits and vegetables. Provided patient with verbal education on the importance of maintaining adequate hydration, eating 4-6 small meals per day with protein, choosing soft cooked proteins and avoiding fibrous grains, fruits and vegetables. Provided patient with basic verbal education on how to reintroduce fiber with adequate hydration once the flare state resolves. Recommended to patient to follow up with an RD; patient states that his doctors have previously recommended this as well. Intern provided patient with written materials for low fiber diet and fiber reintroduction as well. Patient consulted for nutrition services and seen by dietetic intern under supervision. Patient reports 20lbs weight loss in the past 4-5 months; however, has been losing weight for over a year. Patient states that the weight loss in additional to abdominal pain prompted him to seek medical care one month ago, when he was diagnosed with Crohn's. Patient states that his usual body weight prior to weight loss is 165lbs and his current stated weight is 125lbs. His documented weight is 130lbs. Patient reports decreasing appetite; however, attempts to eat 3 meals and a snack per day. 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 Patient began passing gas this admission, per chart, patient's last bowel movement was on 11/9 with no further BMs this admission. Patient reports no nausea/vomiting at this time and no food allergies. Patient states that he takes the following vitamins/minerals per doctor recommendation: B12, vitamin D, folic acid and iron.   Patient has been previously educated on a low-fiber diet as well as a FODMAP diet. Patient states that his usual intake is low in FODMAPs and he is removing the skins from apples and cooking his fruits and vegetables. Provided patient with verbal education on the importance of maintaining adequate hydration, eating 4-6 small meals per day with protein, choosing soft cooked proteins and avoiding fibrous grains, fruits and vegetables. Provided patient with basic verbal education on how to reintroduce fiber with adequate hydration once the flare state resolves. Recommended to patient to follow up with an RD; patient states that his doctors have previously recommended this as well. Intern provided patient with written materials for low fiber diet and fiber reintroduction as well. Encouraged patient to make high protein menu selections and to request energy and protein dense snacks from the menu.

## 2020-11-13 NOTE — DIETITIAN INITIAL EVALUATION ADULT. - ADD RECOMMEND
1. Encourage increasing PO intake and including high protein foods 2. Monitor weights, pertinent labs, PO intake, bowel movements and skin integrity 3. Reinforce education as necessary 4. Liberalize fiber intake once flare state resolves

## 2020-11-13 NOTE — PROGRESS NOTE ADULT - ASSESSMENT
25 year old male recently diagnosed with Crohn's disease (on Budesonide) who presented with one day of severe abdominal pain, nausea and emesis.  CT scan obtained showing a short segment jejunal / ileal thickening causing small bowel obstruction.  Nasogastric tube placed in ED with 250cc clear yellow output s/p removal now tolerating clears.      PLAN:  - Advance to LRD  - Continue with IV Solu-Medrol 20mg q8 hours  - Continue with IV Flagyl and IV Ciprofloxacin  - Pharmacy will not verify Remicade dose, f/u with gi for new dose?   - Out of bed  - VTE prophylaxis with Lovenox subcutaneous  - Patient to get outpatient ramicade dose to be given as outpatient at [facility] Dr. Griffiths  - D/c with Cipro and flagyl for 2 weeks.      #[id number]  A Team Surgery 25 year old male recently diagnosed with Crohn's disease (on Budesonide) who presented with one day of severe abdominal pain, nausea and emesis.  CT scan obtained showing a short segment jejunal / ileal thickening causing small bowel obstruction.  Nasogastric tube placed in ED with 250cc clear yellow output s/p removal now tolerating clears.      PLAN:  - Advance to LRD  - Continue with IV Solu-Medrol 20mg q8 hours  - Continue with IV Flagyl and IV Ciprofloxacin  - Pharmacy will not verify Remicade dose, f/u with gi for new dose?   - Out of bed  - VTE prophylaxis with Lovenox subcutaneous  - Patient to get outpatient ramicade dose to be given as outpatient at [facility] Dr. Griffiths  - D/c with Cipro and flagyl for 2 weeks  - D/c with prednisone 40 daily for 7 days than slowly taper by 5 mg weekly as per gi (D/w fellow)      #[id number]  A Team Surgery 25 year old male recently diagnosed with Crohn's disease (on Budesonide) who presented with one day of severe abdominal pain, nausea and emesis.  CT scan obtained showing a short segment jejunal / ileal thickening causing small bowel obstruction.  Nasogastric tube placed in ED with 250cc clear yellow output s/p removal now tolerating clears.      PLAN:  - Advance to LRD  - Continue with IV Solu-Medrol 20mg q8 hours  - Continue with IV Flagyl and IV Ciprofloxacin  - Give inflectra today as per GI  - Out of bed  - VTE prophylaxis with Lovenox subcutaneous  - Patient to get outpatient ramicade dose to be given as outpatient at [facility] Dr. Griffiths  - D/c with Cipro and flagyl for 2 weeks  - D/c with prednisone 40 daily for 7 days than slowly taper as per gi (D/w fellow)      #[id number]  A Team Surgery

## 2020-11-13 NOTE — CHART NOTE - TREATMENT: THE FOLLOWING DIET HAS BEEN RECOMMENDED
1. Encourage increasing PO intake and including high protein foods   2. Monitor weights, pertinent labs, PO intake, bowel movements and skin integrity   3. Reinforce education as necessary   4. Liberalize fiber intake once flare state resolves    Kate Thomas, Dietetic Intern    Kimberley Rai RDN Pager #[phone]

## 2020-11-13 NOTE — PROGRESS NOTE ADULT - ASSESSMENT
26yo M with recently diagnosed Crohn's who presents with abdominal pain x 1d, found to have SBO in settings of likely Crohn's stricture.    # Likely Crohn's flare - Severe disease given stricturing and SBO. Currently on budesonide. Patient would benefit from initiation of induction therapy (i.e Infliximab), plus a course of IV steroids for a more immediate relief. Patient has documented negative HBV serology and indeterminate QuantiFeron on previous labs done at [facility]. Discussed with patient's GI doctor Dr. Griffiths, agrees with steroids and Remicade.  # SBO - improving with conservative measures.    Recommendations:  - ID recs appreciated  - PPD results reviewed (multiple prior negative PPDs - last done 2019, with no travel since)  - Inflectra 10mg/kg (590mg) ordered by GI fellow  - Next dose to be given as outpatient at [facility] (discussed with his GI doctor at [facility], Dr. Griffiths), has follow up appointment with him on Tuesday  - Can switch IV steroids to prednisone 40mg daily, with plans for slow tapering as outpatient (Will be adjusted by Dr. Griffiths)  - CRP, ESR q48h  - CBC, CMP, INR daily  - Patient is at a high risk for VTE, please continue LMWH    Thank you for involving us in the care of this patient. Please reach out if any further questions.    Vaughn Cote, PGY-4  GI Fellow    Available on [employer] Teams  Pager [phone] ([facility]) or [phone] ([facility])  After 5PM/Weekends, please contact the on-call GI fellow: [phone]  Available through [employer] Teams

## 2020-11-13 NOTE — PROGRESS NOTE ADULT - SUBJECTIVE AND OBJECTIVE BOX
Morning Surgical Progress Note  Patient is a 25y old  Male who presents with a chief complaint of Small bowel obstruction due to Crohn's stricture (12 Nov 2020 15:26)    SUBJECTIVE: Patient seen and examined at bedside with surgical team, patient without complaints. He states he feels "alright." Denies nausea, is passing gas denies bm    Vital Signs Last 24 Hrs  T(C): 36.3 (13 Nov 2020 06:43), Max: 36.6 (12 Nov 2020 14:30)  T(F): 97.3 (13 Nov 2020 06:43), Max: 97.8 (12 Nov 2020 14:30)  HR: 59 (13 Nov 2020 06:43) (59 - 82)  BP: 104/62 (13 Nov 2020 06:43) (100/57 - 110/68)  BP(mean): --  RR: 17 (13 Nov 2020 06:43) (16 - 18)  SpO2: 99% (13 Nov 2020 06:43) (98% - 99%)I&O's Detail    12 Nov 2020 07:01  -  13 Nov 2020 07:00  --------------------------------------------------------  IN:    IV PiggyBack: 400 mL    Lactated Ringers: 900 mL    Lactated Ringers: 1000 mL    Oral Fluid: 1200 mL  Total IN: 3500 mL    OUT:    Estimated Blood Loss (mL): 0 mL    Voided (mL): 4200 mL  Total OUT: 4200 mL    Total NET: -700 mL        Medications  MEDICATIONS  (STANDING):  ciprofloxacin   IVPB 400 milliGRAM(s) IV Intermittent every 12 hours  enoxaparin Injectable 40 milliGRAM(s) SubCutaneous daily  lactated ringers. 1000 milliLiter(s) (75 mL/Hr) IV Continuous <Continuous>  methylPREDNISolone sodium succinate Injectable 20 milliGRAM(s) IV Push every 8 hours  metroNIDAZOLE  IVPB 500 milliGRAM(s) IV Intermittent every 8 hours    MEDICATIONS  (PRN):  benzocaine 15 mG/menthol 3.6 mG (Sugar-Free) Lozenge 1 Lozenge Oral every 3 hours PRN ngt  tetracaine/benzocaine/butamben Spray 1 Spray(s) Topical every 3 hours PRN ngt    Physical Exam  Constitutional: A&Ox3, NAD  Gastrointestinal: Soft nontender, nondistended  Extremities: Moving all extremities, no edema    LABS:                        10.6   4.41  )-----------( 343      ( 12 Nov 2020 06:15 )             37.0     11-12    139  |  103  |  8   ----------------------------<  101<H>  4.5   |  26  |  0.65    Ca    9.2      12 Nov 2020 06:15  Phos  4.4     11-12  Mg     1.9     11-12

## 2020-11-13 NOTE — DISCHARGE NOTE NURSING/CASE MANAGEMENT/SOCIAL WORK - PATIENT PORTAL LINK FT
No You can access the FollowMyHealth Patient Portal offered by [facility] by registering at the following website: [email or web address]. By joining [employer]’s FollowMyHealth portal, you will also be able to view your health information using other applications (apps) compatible with our system.

## 2020-11-14 ENCOUNTER — TRANSCRIPTION ENCOUNTER (OUTPATIENT)
Age: 25
End: 2020-11-14

## 2020-12-26 ENCOUNTER — INPATIENT (INPATIENT)
Facility: HOSPITAL | Age: 25
LOS: 3 days | Discharge: HOME | End: 2020-12-30
Attending: SURGERY | Admitting: SURGERY
Payer: MEDICAID

## 2020-12-26 VITALS
RESPIRATION RATE: 18 BRPM | DIASTOLIC BLOOD PRESSURE: 100 MMHG | HEIGHT: 73 IN | SYSTOLIC BLOOD PRESSURE: 124 MMHG | HEART RATE: 111 BPM | TEMPERATURE: 98 F | OXYGEN SATURATION: 99 %

## 2020-12-26 NOTE — ED ADULT TRIAGE NOTE - CHIEF COMPLAINT QUOTE
Pt c/o abdominal pain & vomiting starting a few hours ago. States he noticed he hasn't passed gas today. Recently was dx with an SBO last month, had an NG tube. Hx of Crohn's disease. Appears uncomfortable, endorses having a lot of pain.

## 2020-12-27 DIAGNOSIS — K56.609 UNSPECIFIED INTESTINAL OBSTRUCTION, UNSPECIFIED AS TO PARTIAL VERSUS COMPLETE OBSTRUCTION: ICD-10-CM

## 2020-12-27 PROBLEM — K50.90 CROHN'S DISEASE, UNSPECIFIED, WITHOUT COMPLICATIONS: Chronic | Status: ACTIVE | Noted: 2020-11-10

## 2020-12-27 LAB
ALBUMIN SERPL ELPH-MCNC: 4 G/DL — SIGNIFICANT CHANGE UP (ref 3.3–5)
ALP SERPL-CCNC: 78 U/L — SIGNIFICANT CHANGE UP (ref 40–120)
ALT FLD-CCNC: 32 U/L — SIGNIFICANT CHANGE UP (ref 4–41)
ANION GAP SERPL CALC-SCNC: 13 MMOL/L — SIGNIFICANT CHANGE UP (ref 7–14)
APTT BLD: 35.5 SEC — SIGNIFICANT CHANGE UP (ref 27–36.3)
AST SERPL-CCNC: 28 U/L — SIGNIFICANT CHANGE UP (ref 4–40)
B PERT DNA SPEC QL NAA+PROBE: SIGNIFICANT CHANGE UP
BASOPHILS # BLD AUTO: 0.04 K/UL — SIGNIFICANT CHANGE UP (ref 0–0.2)
BASOPHILS NFR BLD AUTO: 0.3 % — SIGNIFICANT CHANGE UP (ref 0–2)
BILIRUB SERPL-MCNC: 0.2 MG/DL — SIGNIFICANT CHANGE UP (ref 0.2–1.2)
BLD GP AB SCN SERPL QL: NEGATIVE — SIGNIFICANT CHANGE UP
BLOOD GAS VENOUS COMPREHENSIVE RESULT: SIGNIFICANT CHANGE UP
BUN SERPL-MCNC: 10 MG/DL — SIGNIFICANT CHANGE UP (ref 7–23)
C PNEUM DNA SPEC QL NAA+PROBE: SIGNIFICANT CHANGE UP
CALCIUM SERPL-MCNC: 10.3 MG/DL — SIGNIFICANT CHANGE UP (ref 8.4–10.5)
CHLORIDE SERPL-SCNC: 102 MMOL/L — SIGNIFICANT CHANGE UP (ref 98–107)
CO2 SERPL-SCNC: 25 MMOL/L — SIGNIFICANT CHANGE UP (ref 22–31)
CREAT SERPL-MCNC: 0.83 MG/DL — SIGNIFICANT CHANGE UP (ref 0.5–1.3)
CRP SERPL-MCNC: 31 MG/L — HIGH
EOSINOPHIL # BLD AUTO: 0.05 K/UL — SIGNIFICANT CHANGE UP (ref 0–0.5)
EOSINOPHIL NFR BLD AUTO: 0.3 % — SIGNIFICANT CHANGE UP (ref 0–6)
ERYTHROCYTE [SEDIMENTATION RATE] IN BLOOD: 19 MM/HR — SIGNIFICANT CHANGE UP (ref 1–15)
FLUAV H1 2009 PAND RNA SPEC QL NAA+PROBE: SIGNIFICANT CHANGE UP
FLUAV H1 RNA SPEC QL NAA+PROBE: SIGNIFICANT CHANGE UP
FLUAV H3 RNA SPEC QL NAA+PROBE: SIGNIFICANT CHANGE UP
FLUAV SUBTYP SPEC NAA+PROBE: SIGNIFICANT CHANGE UP
FLUBV RNA SPEC QL NAA+PROBE: SIGNIFICANT CHANGE UP
GLUCOSE SERPL-MCNC: 107 MG/DL — HIGH (ref 70–99)
HADV DNA SPEC QL NAA+PROBE: SIGNIFICANT CHANGE UP
HCOV PNL SPEC NAA+PROBE: SIGNIFICANT CHANGE UP
HCT VFR BLD CALC: 46.1 % — SIGNIFICANT CHANGE UP (ref 39–50)
HGB BLD-MCNC: 14.5 G/DL — SIGNIFICANT CHANGE UP (ref 13–17)
HMPV RNA SPEC QL NAA+PROBE: SIGNIFICANT CHANGE UP
HPIV1 RNA SPEC QL NAA+PROBE: SIGNIFICANT CHANGE UP
HPIV2 RNA SPEC QL NAA+PROBE: SIGNIFICANT CHANGE UP
HPIV3 RNA SPEC QL NAA+PROBE: SIGNIFICANT CHANGE UP
HPIV4 RNA SPEC QL NAA+PROBE: SIGNIFICANT CHANGE UP
IANC: 11.87 K/UL — HIGH (ref 1.5–8.5)
IMM GRANULOCYTES NFR BLD AUTO: 0.4 % — SIGNIFICANT CHANGE UP (ref 0–1.5)
INR BLD: 1.19 RATIO — HIGH (ref 0.88–1.16)
LIDOCAIN IGE QN: 12 U/L — SIGNIFICANT CHANGE UP (ref 7–60)
LYMPHOCYTES # BLD AUTO: 1.27 K/UL — SIGNIFICANT CHANGE UP (ref 1–3.3)
LYMPHOCYTES # BLD AUTO: 8.9 % — LOW (ref 13–44)
MCHC RBC-ENTMCNC: 24.3 PG — LOW (ref 27–34)
MCHC RBC-ENTMCNC: 31.5 GM/DL — LOW (ref 32–36)
MCV RBC AUTO: 77.3 FL — LOW (ref 80–100)
MONOCYTES # BLD AUTO: 1.01 K/UL — HIGH (ref 0–0.9)
MONOCYTES NFR BLD AUTO: 7.1 % — SIGNIFICANT CHANGE UP (ref 2–14)
NEUTROPHILS # BLD AUTO: 11.87 K/UL — HIGH (ref 1.8–7.4)
NEUTROPHILS NFR BLD AUTO: 83 % — HIGH (ref 43–77)
NRBC # BLD: 0 /100 WBCS — SIGNIFICANT CHANGE UP
NRBC # FLD: 0 K/UL — SIGNIFICANT CHANGE UP
PLATELET # BLD AUTO: 426 K/UL — HIGH (ref 150–400)
POTASSIUM SERPL-MCNC: 3.5 MMOL/L — SIGNIFICANT CHANGE UP (ref 3.5–5.3)
POTASSIUM SERPL-SCNC: 3.5 MMOL/L — SIGNIFICANT CHANGE UP (ref 3.5–5.3)
PROT SERPL-MCNC: 7.5 G/DL — SIGNIFICANT CHANGE UP (ref 6–8.3)
PROTHROM AB SERPL-ACNC: 13.6 SEC — SIGNIFICANT CHANGE UP (ref 10.6–13.6)
RAPID RVP RESULT: SIGNIFICANT CHANGE UP
RBC # BLD: 5.96 M/UL — HIGH (ref 4.2–5.8)
RBC # FLD: 19.1 % — HIGH (ref 10.3–14.5)
RH IG SCN BLD-IMP: POSITIVE — SIGNIFICANT CHANGE UP
RSV RNA SPEC QL NAA+PROBE: SIGNIFICANT CHANGE UP
RV+EV RNA SPEC QL NAA+PROBE: SIGNIFICANT CHANGE UP
SARS-COV-2 RNA SPEC QL NAA+PROBE: SIGNIFICANT CHANGE UP
SODIUM SERPL-SCNC: 140 MMOL/L — SIGNIFICANT CHANGE UP (ref 135–145)
WBC # BLD: 14.3 K/UL — HIGH (ref 3.8–10.5)
WBC # FLD AUTO: 14.3 K/UL — HIGH (ref 3.8–10.5)

## 2020-12-27 PROCEDURE — 74177 CT ABD & PELVIS W/CONTRAST: CPT | Mod: 26

## 2020-12-27 PROCEDURE — 99285 EMERGENCY DEPT VISIT HI MDM: CPT

## 2020-12-27 PROCEDURE — 99223 1ST HOSP IP/OBS HIGH 75: CPT

## 2020-12-27 PROCEDURE — 71045 X-RAY EXAM CHEST 1 VIEW: CPT | Mod: 26

## 2020-12-27 RX ORDER — INFLIXIMAB-DYYB 120 MG/ML
1 INJECTION SUBCUTANEOUS
Qty: 0 | Refills: 0 | DISCHARGE

## 2020-12-27 RX ORDER — SODIUM CHLORIDE 9 MG/ML
1000 INJECTION, SOLUTION INTRAVENOUS ONCE
Refills: 0 | Status: COMPLETED | OUTPATIENT
Start: 2020-12-27 | End: 2020-12-27

## 2020-12-27 RX ORDER — ONDANSETRON 8 MG/1
4 TABLET, FILM COATED ORAL ONCE
Refills: 0 | Status: COMPLETED | OUTPATIENT
Start: 2020-12-27 | End: 2020-12-27

## 2020-12-27 RX ORDER — METRONIDAZOLE 500 MG
TABLET ORAL
Refills: 0 | Status: DISCONTINUED | OUTPATIENT
Start: 2020-12-27 | End: 2020-12-30

## 2020-12-27 RX ORDER — PREGABALIN 225 MG/1
1 CAPSULE ORAL
Qty: 0 | Refills: 0 | DISCHARGE

## 2020-12-27 RX ORDER — FOLIC ACID 0.8 MG
1 TABLET ORAL
Qty: 0 | Refills: 0 | DISCHARGE

## 2020-12-27 RX ORDER — BENZOCAINE AND MENTHOL 5; 1 G/100ML; G/100ML
1 LIQUID ORAL ONCE
Refills: 0 | Status: COMPLETED | OUTPATIENT
Start: 2020-12-27 | End: 2020-12-27

## 2020-12-27 RX ORDER — SODIUM CHLORIDE 9 MG/ML
1000 INJECTION, SOLUTION INTRAVENOUS
Refills: 0 | Status: DISCONTINUED | OUTPATIENT
Start: 2020-12-27 | End: 2020-12-29

## 2020-12-27 RX ORDER — ENOXAPARIN SODIUM 100 MG/ML
40 INJECTION SUBCUTANEOUS DAILY
Refills: 0 | Status: DISCONTINUED | OUTPATIENT
Start: 2020-12-27 | End: 2020-12-30

## 2020-12-27 RX ORDER — METRONIDAZOLE 500 MG
500 TABLET ORAL ONCE
Refills: 0 | Status: COMPLETED | OUTPATIENT
Start: 2020-12-27 | End: 2020-12-27

## 2020-12-27 RX ORDER — TETRACAINE/BENZOCAINE/BUTAMBEN 2%-14%-2%
1 OINTMENT (GRAM) TOPICAL ONCE
Refills: 0 | Status: DISCONTINUED | OUTPATIENT
Start: 2020-12-27 | End: 2020-12-27

## 2020-12-27 RX ORDER — METRONIDAZOLE 500 MG
500 TABLET ORAL EVERY 8 HOURS
Refills: 0 | Status: DISCONTINUED | OUTPATIENT
Start: 2020-12-27 | End: 2020-12-30

## 2020-12-27 RX ORDER — MORPHINE SULFATE 50 MG/1
2 CAPSULE, EXTENDED RELEASE ORAL ONCE
Refills: 0 | Status: DISCONTINUED | OUTPATIENT
Start: 2020-12-27 | End: 2020-12-27

## 2020-12-27 RX ORDER — CIPROFLOXACIN LACTATE 400MG/40ML
400 VIAL (ML) INTRAVENOUS EVERY 12 HOURS
Refills: 0 | Status: DISCONTINUED | OUTPATIENT
Start: 2020-12-27 | End: 2020-12-30

## 2020-12-27 RX ORDER — PANTOPRAZOLE SODIUM 20 MG/1
40 TABLET, DELAYED RELEASE ORAL DAILY
Refills: 0 | Status: DISCONTINUED | OUTPATIENT
Start: 2020-12-27 | End: 2020-12-30

## 2020-12-27 RX ORDER — BENZOCAINE AND MENTHOL 5; 1 G/100ML; G/100ML
1 LIQUID ORAL
Refills: 0 | Status: DISCONTINUED | OUTPATIENT
Start: 2020-12-27 | End: 2020-12-29

## 2020-12-27 RX ORDER — CHOLECALCIFEROL (VITAMIN D3) 125 MCG
1 CAPSULE ORAL
Qty: 0 | Refills: 0 | DISCHARGE

## 2020-12-27 RX ADMIN — Medication 20 MILLIGRAM(S): at 17:39

## 2020-12-27 RX ADMIN — Medication 200 MILLIGRAM(S): at 21:36

## 2020-12-27 RX ADMIN — Medication 100 MILLIGRAM(S): at 17:40

## 2020-12-27 RX ADMIN — Medication 100 MILLIGRAM(S): at 09:49

## 2020-12-27 RX ADMIN — SODIUM CHLORIDE 1000 MILLILITER(S): 9 INJECTION, SOLUTION INTRAVENOUS at 01:29

## 2020-12-27 RX ADMIN — ONDANSETRON 4 MILLIGRAM(S): 8 TABLET, FILM COATED ORAL at 07:26

## 2020-12-27 RX ADMIN — ENOXAPARIN SODIUM 40 MILLIGRAM(S): 100 INJECTION SUBCUTANEOUS at 12:19

## 2020-12-27 RX ADMIN — SODIUM CHLORIDE 100 MILLILITER(S): 9 INJECTION, SOLUTION INTRAVENOUS at 07:26

## 2020-12-27 RX ADMIN — Medication 200 MILLIGRAM(S): at 10:59

## 2020-12-27 RX ADMIN — ONDANSETRON 4 MILLIGRAM(S): 8 TABLET, FILM COATED ORAL at 01:26

## 2020-12-27 RX ADMIN — BENZOCAINE AND MENTHOL 1 LOZENGE: 5; 1 LIQUID ORAL at 20:04

## 2020-12-27 RX ADMIN — PANTOPRAZOLE SODIUM 40 MILLIGRAM(S): 20 TABLET, DELAYED RELEASE ORAL at 17:40

## 2020-12-27 RX ADMIN — BENZOCAINE AND MENTHOL 1 LOZENGE: 5; 1 LIQUID ORAL at 03:38

## 2020-12-27 RX ADMIN — MORPHINE SULFATE 2 MILLIGRAM(S): 50 CAPSULE, EXTENDED RELEASE ORAL at 01:27

## 2020-12-27 NOTE — H&P ADULT - NSHPLABSRESULTS_GEN_ALL_CORE
Labs:                        14.5   14.30 )-----------( 426      ( 27 Dec 2020 01:44 )             46.1     PT/INR - ( 27 Dec 2020 01:44 )   PT: 13.6 sec;   INR: 1.19 ratio         PTT - ( 27 Dec 2020 01:44 )  PTT:35.5 sec  12-27    140  |  102  |  10  ----------------------------<  107<H>  3.5   |  25  |  0.83    Ca    10.3      27 Dec 2020 01:44    TPro  7.5  /  Alb  4.0  /  TBili  0.2  /  DBili  x   /  AST  28  /  ALT  32  /  AlkPhos  78  12-27            Imaging and other studies:  < from: CT Abdomen and Pelvis w/ IV Cont (12.27.20 @ 01:42) >    LOWER CHEST: Within normal limits.  LIVER: Within normal limits.  BILE DUCTS: Normal caliber.  GALLBLADDER: Within normal limits.  SPLEEN: Within normal limits.  PANCREAS: Within normal limits.  ADRENALS: Within normal limits.    KIDNEYS/URETERS: Within normal limits.  BLADDER: Within normal limits.  REPRODUCTIVE ORGANS: Prostate within normal limits.    BOWEL: Multifocal short and moderate segmental bowel wall thickening and luminal narrowing with associated mesenteric hypervascularity, most prominently in the right lower quadrant and pelvis (2:92 and 2:94). Thesmall bowel is dilated to 6 cm with mild equalization between these 2 points of narrowing. Dilated fluid-filled loops of proximal small bowel and collapsed distal loops of small bowel. The terminal ileum is normal in appearance.. Appendix is normal.    PERITONEUM: No fluid collection or abscess.  VESSELS: Within normal limits.  RETROPERITONEUM/LYMPH NODES: Mildly prominent mesenteric lymph nodes No retroperitoneal lymphadenopathy.  ABDOMINAL WALL: Within normal limits.  BONES: Within normal limits.    IMPRESSION:    Active inflammatory bowel disease in the right lower quadrant and pelvis with associated strictures resultant partial upstream small bowel obstruction, in keeping with history of Crohn's disease. No fluid collection or abscess.

## 2020-12-27 NOTE — ED PROVIDER NOTE - PROGRESS NOTE DETAILS
Ronda-PGY2: CT showing air fluid levels and small bowel distension consistent with SBO. Awaiting official radiology read. Surgery currently at bedside placing NGT.

## 2020-12-27 NOTE — CONSULT NOTE ADULT - ATTENDING COMMENTS
History/PE as above.  Patient seen/examined on 12/28/2020. Known patient with small bowel Crohn's disease. Was readmitted with recurrent SBO. Since admission clinically improved-NGT now discontinued. Passing flatus. Abdominal pain significantly reduced. Reviewed CT scan with radiology-high-grade inflammatory stricture x 2 is noted in distal ileum resulting in SBO. No abscess, fistula or other complicating features. As noted, patient completed 2 doses of Remicade so far and completes induction on 12/30/2020. Patient's care coordinated with his primary GI from [facility]. Recommend: check infliximab level/antibody and then proceed with administration of Remicade dose 10 mg/kilogram to complete induction therapy. Continue Solu-Medrol 20 mg a per 8 hours for now with plan for transition to oral therapy and taper pending continued clinical improvement. IV hydration and resumption of oral intake as per surgery service.

## 2020-12-27 NOTE — DIETITIAN INITIAL EVALUATION ADULT. - SIGNS/SYMPTOMS
Ashlie North(Attending) PO<75% nutrition needs >1 mon; wt loss of ~14% x 5 months; moderate loss of body fat + muscle mass

## 2020-12-27 NOTE — ED ADULT NURSE NOTE - OBJECTIVE STATEMENT
Break Coverage RN: Patient endorsing abdominal pain x 4-5 days and NBNB emesis today. States no BM x 1 day and feels  like when he had SBO prior. Patient states SBO treated with NGT last time and did not need surgery. Denies fevers/chills/CP/SOB. Awaiting CT scan. MD evaluated. IV place #20g RT arm. Report endorsed to primary RN.

## 2020-12-27 NOTE — DIETITIAN INITIAL EVALUATION ADULT. - ADD RECOMMEND
1. Advance diet as tolerated when medically appropriate per MD order. 2. May consider Low fiber as target diet when PO resumed. 3. Monitor weights, labs, BM's, skin integrity, NPO status.  4. Reinforce diet education with patient PRN.

## 2020-12-27 NOTE — H&P ADULT - ATTENDING COMMENTS
pt with a h/o crohn;'s ad recent admission for sbo tx conservatively. Recently completely a steroid taper. On Remicaide x2 doses so far. Followed by an outside GI    CT with dilated SB  NGT placed  NPO, IVF, IV abx  GI consult for ? need for steroids

## 2020-12-27 NOTE — DIETITIAN NUTRITION RISK NOTIFICATION - TREATMENT: THE FOLLOWING DIET HAS BEEN RECOMMENDED
1. Advance diet as tolerated when medically appropriate per MD order. 2. May consider Low fiber as target diet when PO resumed. 3. Monitor weights, labs, BM's, skin integrity, NPO status.  4. Reinforce diet education with patient PRN.    Please refer to nutrition assessment completed on 12/27/20 for other details.

## 2020-12-27 NOTE — H&P ADULT - HISTORY OF PRESENT ILLNESS
25 year old male with PMH Crohn's Disease on Remicade (last infusion ~1 month ago), recent admission for SBO presents with abdominal pain x 3 days. Pt reports epigastric and periumbilical "pressure" described as waxing and waning and worsening. Pt states last BM today, normal per pt, denies any bloody or black and tarry stools. Pt admits to 4-5 episodes of nonbloody nonbilious emesis today. Pt reports similar episode to past SBO. Pt states he's not passing flatus. Denies any history of abdominal surgeries in the past. Denies any fevers, chest pain, shortness of breath, diarrhea, bloody stools, recent trauma or travel. 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 Denies any additional complaints.

## 2020-12-27 NOTE — ED PROVIDER NOTE - OBJECTIVE STATEMENT
25 year old male with PMH Crohn's Disease on Remicade (last infusion ~1 month ago), recent admission for SBO presents with abdominal pain x 3 days. Pt states he had colonoscopy 4 days ago and reports abdominal pain starting 3 days ago. Pt reports epigastric and periumbilical "pressure" described as waxing and waning and worsening. Pt states last BM today, normal per pt, denies any bloody or black and tarry stools. Pt admits to 4-5 episodes of nonbloody nonbilious emesis today. Pt reports similar episode to past SBO. Pt states he's not passing flatus. Denies any history of abdominal surgeries in the past. Denies any fevers, chest pain, shortness of breath, diarrhea,    TO BE COMPLETED 25 year old male with PMH Crohn's Disease on Remicade (last infusion ~1 month ago), recent admission for SBO presents with abdominal pain x 3 days. Pt states he had colonoscopy 4 days ago and reports abdominal pain starting 3 days ago. Pt reports epigastric and periumbilical "pressure" described as waxing and waning and worsening. Pt states last BM today, normal per pt, denies any bloody or black and tarry stools. Pt admits to 4-5 episodes of nonbloody nonbilious emesis today. Pt reports similar episode to past SBO. Pt states he's not passing flatus. Denies any history of abdominal surgeries in the past. Denies any fevers, chest pain, shortness of breath, diarrhea, bloody stools, black tarry stools, or rash. Denies any additional complaints.     TO BE COMPLETED

## 2020-12-27 NOTE — ED PROVIDER NOTE - ATTENDING CONTRIBUTION TO CARE
agree with resident note    "25 year old male with PMH Crohn's Disease on Remicade (last infusion ~1 month ago), recent admission for SBO presents with abdominal pain x 3 days. Pt states he had colonoscopy 4 days ago and reports abdominal pain starting 3 days ago. Pt reports epigastric and periumbilical "pressure" described as waxing and waning and worsening. Pt states last BM today, normal per pt, denies any bloody or black and tarry stools. Pt admits to 4-5 episodes of nonbloody nonbilious emesis today. Pt reports similar episode to past SBO."  States recently finished Remicade and 4 week course of steroids and was feeling great but then last 2 days, decrease to no flatus being passed and now vomiting    PE: young thin male; VSS; CTAB/L; s1 s2 no m/r/g abd soft/ TTP over mid abdomen; ext: no edema    Imp: r/o SBO vs Crohn's flare

## 2020-12-27 NOTE — DIETITIAN INITIAL EVALUATION ADULT. - OTHER INFO
Patient remains on NPO status per MD order, receiving IVF LR. Patient reports no GI distress (nausea/vomiting/diarrhea/constipation) at this time. Per chart, patient is on NGT to LCWS with bilious output. Pending GI consult.  Noted patient last seen by RDN on 11/13/20 from last hospitalization and low fiber diet education with handouts was provided to patient at that time. This RDN reinforced diet education with patient at visit today, patient verbalized understanding to low fiber/FODMAPs diet but declined written handouts at this time. Patient has no questions about education today.    Per patient, #, noted ~20# wt loss over the past 5 months previously reported, but patient states his wt appears stable over the past month at ~125#. No current weight documented at this time -- RN to obtain new weight. Noted wt was 59kg/130# on 11/11/20 per HIE.    Skin: No pressure injuries  Edema: none noted.

## 2020-12-27 NOTE — CONSULT NOTE ADULT - SUBJECTIVE AND OBJECTIVE BOX
Chief Complaint:  Patient is a 25y old  Male who presents with a chief complaint of Crohns with SBO (27 Dec 2020 04:39)      HPI:  Mr. Griffiths is a 26yo M with recently diagnosed Crohn's on remicade initiation and recent SBO who presents with abdominal pain x 3d. Pain epigastric and periumbilical pressure. Had bowel movements until day of presentation, no melena/hematochezia/diarrhea. Had severale pisodes of nbnb emesis. On presentation, patient stopped passing flatus. Imaging concerning for SBO. NGT placed started on cipro/flagyl, and admitted to surgery. Since admission, abd pain improved. Still not passing flatus/stools.     IBD history:   Patient recently admitted at [facility] (10/20) for abdominal pain. Imaging and double balloon enteroscopy highly suggestive of Crohn's disease (biopsy pending). He was started on budesonide with plan to transition to Remicade. He follows with Dr. Alfie Griffiths. He was admitted to [facility] 11/10/20 for SBO, treated w/ IV steroids and initiated on remicade. As outpatient patient continued remicade initiation (due for 6 week infusion 12/30/20), tapered steroids (last dose 10 days ago). Underwent colonoscopy on week prior to presentation w/o disease. Underwent MRE told to have stricturing disease.     Allergies:  No Known Allergies      Home Medications:    Hospital Medications:  benzocaine 15 mG/menthol 3.6 mG (Sugar-Free) Lozenge 1 Lozenge Oral two times a day PRN  ciprofloxacin   IVPB 400 milliGRAM(s) IV Intermittent every 12 hours  enoxaparin Injectable 40 milliGRAM(s) SubCutaneous daily  lactated ringers. 1000 milliLiter(s) IV Continuous <Continuous>  metroNIDAZOLE  IVPB      metroNIDAZOLE  IVPB 500 milliGRAM(s) IV Intermittent every 8 hours      PMHX/PSHX:  Crohn disease    No significant past surgical history        Family history:      Denies family history of colon cancer/polyps, stomach cancer/polyps, pancreatic cancer/masses, liver cancer/disease, ovarian cancer and endometrial cancer.    Social History:   Tob: Denies  EtOH: Denies  Illicit Drugs: Denies    ROS:     General:  No wt loss, fevers, chills, night sweats, fatigue  Eyes:  Good vision, no reported pain  ENT:  No sore throat, pain, runny nose, dysphagia  CV:  No pain, palpitations, hypo/hypertension  Pulm:  No dyspnea, cough, tachypnea, wheezing  GI:  see HPI  :  No pain, bleeding, incontinence, nocturia  Muscle:  No pain, weakness  Neuro:  No weakness, tingling, memory problems  Psych:  No fatigue, insomnia, mood problems, depression  Endocrine:  No polyuria, polydipsia, cold/heat intolerance  Heme:  No petechiae, ecchymosis, easy bruisability  Skin:  No rash, tattoos, scars, edema    PHYSICAL EXAM:     GENERAL:  No acute distress  HEENT:  NGT in place w/ bile in canister  CHEST:  no respiratory distress  HEART:  Regular rate and rhythm  ABDOMEN:  Soft, non-tender, non-distended, normoactive bowel sounds,  no masses, no hepato-splenomegaly, no signs of chronic liver disease  EXTREMITIES: No edema  SKIN:  No rash/erythema/ecchymoses/petechiae/wounds/abscess/warm/dry  NEURO:  Alert and oriented x 3, no asterixis    Vital Signs:  Vital Signs Last 24 Hrs  T(C): 36.7 (27 Dec 2020 06:30), Max: 36.9 (27 Dec 2020 04:21)  T(F): 98 (27 Dec 2020 06:30), Max: 98.5 (27 Dec 2020 04:21)  HR: 90 (27 Dec 2020 06:30) (90 - 111)  BP: 121/77 (27 Dec 2020 06:30) (119/74 - 124/100)  BP(mean): --  RR: 18 (27 Dec 2020 06:30) (18 - 20)  SpO2: 100% (27 Dec 2020 06:30) (99% - 100%)  Daily Height in cm: 185.42 (26 Dec 2020 23:55)    Daily     LABS:                        14.5   14.30 )-----------( 426      ( 27 Dec 2020 01:44 )             46.1     Mean Cell Volume: 77.3 fL (12-27-20 @ 01:44)    12-27    140  |  102  |  10  ----------------------------<  107<H>  3.5   |  25  |  0.83    Ca    10.3      27 Dec 2020 01:44  Phos  2.0     12-27  Mg     1.9     12-27    TPro  7.5  /  Alb  4.0  /  TBili  0.2  /  DBili  x   /  AST  28  /  ALT  32  /  AlkPhos  78  12-27    LIVER FUNCTIONS - ( 27 Dec 2020 01:44 )  Alb: 4.0 g/dL / Pro: 7.5 g/dL / ALK PHOS: 78 U/L / ALT: 32 U/L / AST: 28 U/L / GGT: x           PT/INR - ( 27 Dec 2020 01:44 )   PT: 13.6 sec;   INR: 1.19 ratio         PTT - ( 27 Dec 2020 01:44 )  PTT:35.5 sec    Amylase Serum--      Lipase serum12       Ammonia--                          14.5   14.30 )-----------( 426      ( 27 Dec 2020 01:44 )             46.1       Imaging:  CT A/P 12/27/20  FINDINGS:  LOWER CHEST: Within normal limits.  LIVER: Within normal limits.  BILE DUCTS: Normal caliber.  GALLBLADDER: Within normal limits.  SPLEEN: Within normal limits.  PANCREAS: Within normal limits.  ADRENALS: Within normal limits.    KIDNEYS/URETERS: Within normal limits.  BLADDER: Within normal limits.  REPRODUCTIVE ORGANS: Prostate within normal limits.    BOWEL: Multifocal short and moderate segmental bowel wall thickening and luminal narrowing with associated mesenteric hypervascularity, most prominently in the right lower quadrant and pelvis (2:92 and 2:94). Thesmall bowel is dilated to 6 cm with mild equalization between these 2 points of narrowing. Dilated fluid-filled loops of proximal small bowel and collapsed distal loops of small bowel. The terminal ileum is normal in appearance.. Appendix is normal.    PERITONEUM: No fluid collection or abscess.  VESSELS: Within normal limits.  RETROPERITONEUM/LYMPH NODES: Mildly prominent mesenteric lymph nodes No retroperitoneal lymphadenopathy.  ABDOMINAL WALL: Within normal limits.  BONES: Within normal limits.    IMPRESSION:    Active inflammatory bowel disease in the right lower quadrant and pelvis with associated strictures resultant partial upstream small bowel obstruction, in keeping with history of Crohn's disease. No fluid collection or abscess.

## 2020-12-27 NOTE — ED PROVIDER NOTE - PHYSICAL EXAMINATION
CONSTITUTIONAL: thin male, uncomfortable appearing  SKIN: no rash, no petechiae.  EYES: pink conjunctiva, anicteric  ENT: tongue and uvular midline, no exudates, moist mucous membranes  NECK: Supple; no meningismus, no JVD  CARD: RRR, no murmurs, equal radial pulses bilaterally 2+  RESP: CTAB, no respiratory distress  ABD: Soft, tender over epigastric and periumbilical regions, non-distended, no peritoneal signs  EXT: Normal ROM x4. No edema.   NEURO: Alert, oriented. Neuro exam nonfocal  PSYCH: Cooperative, appropriate.

## 2020-12-27 NOTE — ED ADULT NURSE REASSESSMENT NOTE - NS ED NURSE REASSESS COMMENT FT1
PT aaox4. NG tube placed by surgery team. Pt tolerated well. Awaiting x-ray for placement confirmation at this time. Will continue to monitor.

## 2020-12-27 NOTE — CONSULT NOTE ADULT - ASSESSMENT
Impression:  24 yo M w/ PMHx recently diagnosed small bowel Crohn's disease (stricturing, recent SBO 11/2020 treated w/ steroids/remicade initiation due for 6 week infusion 12/30/20) presenting w/ abd pain and nbnb emesis x 3d, found to have recurrent SBO    # CD - small bowel CD, recent normal colonsocopy, MRE showing active disease. Presenting w/ SBO. Inflammatory markers mildly elevated (though improved from prior), still not passing stool/flatus    Recommendations:   - c/w NGT per surgery management of SBO  - start steroids solumedrol 20mg q 8 hours  - if patient starts having bowel movements, would send for infectious workup (stool PCR, culture, c diff)  - c/w remicade initiation (due for infusion 12/30/20)  - GI to reach out to patient's GI at [facility]  - rest of care per primary team    Thank you for this interesting consult. GI will continue to follow.     Tony Powell, PGY4  Gastroenterology Fellow  Available on Microsoft Teams  [phone] ([place] Short Range Pager)  [phone] (Long Range Pager)    After 5pm, please contact the on-call GI fellow. [phone]

## 2020-12-27 NOTE — DIETITIAN INITIAL EVALUATION ADULT. - ORAL INTAKE PTA/DIET HISTORY
Per patient, his PO intake decreased over past month and is poor over the past 3 days with + abd pain and multiple episodes of nonbloody nonbilious emesis on day of adm. Confirmed with patient he has been following a low fiber diet at home with 3 meals/day, and taking supplements including B12, vitamin D, folic acid, and iron. No changes to his diet recently. Patient denies chewing/swallowing difficulties to foods/liquids.

## 2020-12-27 NOTE — H&P ADULT - ASSESSMENT
25 y.o Crohns disease presenting with abd. pain x3 days founds to have SBO secondary to Crohns stricture     - admit to [facility]   - NPO   - IVF  - NGT   - GI consult     To be discussed with attending   [id number]

## 2020-12-27 NOTE — DIETITIAN INITIAL EVALUATION ADULT. - REASON FOR ADMISSION
26 y/o male with hx of Crohn's disease presenting with abd pain x3 days founds to have SBO secondary to Crohns stricture.

## 2020-12-27 NOTE — ED PROVIDER NOTE - CLINICAL SUMMARY MEDICAL DECISION MAKING FREE TEXT BOX
Ronda-PGY2: 25 year old male with PMH Crohn's Disease on Remicade (last infusion ~1 month ago), recent admission for SBO presents with abdominal pain x 3 days. Pt states he had colonoscopy 4 days ago and reports abdominal pain starting 3 days ago. Pt reports epigastric and periumbilical "pressure" described as waxing and waning and worsening. Pt states last BM today, normal per pt, denies any bloody or black and tarry stools. Pt admits to 4-5 episodes of nonbloody nonbilious emesis today. Pt reports similar episode to past SBO. Pt states he's not passing flatus. Likely secondary to recurrent SBO vs. Crohn's flare vs. pancreatitis. Will obtain labs, imaging, supportive care, and reassessment with dispo accordingly.

## 2020-12-27 NOTE — DIETITIAN INITIAL EVALUATION ADULT. - PERTINENT MEDS FT
MEDICATIONS  (STANDING):  ciprofloxacin   IVPB 400 milliGRAM(s) IV Intermittent every 12 hours  enoxaparin Injectable 40 milliGRAM(s) SubCutaneous daily  lactated ringers. 1000 milliLiter(s) (100 mL/Hr) IV Continuous <Continuous>  metroNIDAZOLE  IVPB      metroNIDAZOLE  IVPB 500 milliGRAM(s) IV Intermittent every 8 hours    MEDICATIONS  (PRN):  benzocaine 15 mG/menthol 3.6 mG (Sugar-Free) Lozenge 1 Lozenge Oral two times a day PRN Sore Throat

## 2020-12-27 NOTE — DIETITIAN INITIAL EVALUATION ADULT. - PERTINENT LABORATORY DATA
12-27 Na140 mmol/L Glu 107 mg/dL<H> K+ 3.5 mmol/L Cr  0.83 mg/dL BUN 10 mg/dL 12-27 Phos 2.0 mg/dL<L> 12-27 Alb 4.0 g/dL

## 2020-12-27 NOTE — H&P ADULT - NSHPPHYSICALEXAM_GEN_ALL_CORE
Physical Exam  T(C): 36.9  HR: 103 (103 - 111)  BP: 119/74 (119/74 - 124/100)  RR: 20 (18 - 20)  SpO2: 100% (99% - 100%)  Tmax: T(C): , Max: 36.9 (12-27-20 @ 04:21)    General: well developed, well nourished, NAD  Neuro: alert and oriented, no focal deficits, moves all extremities spontaneously  HEENT: NCAT, EOMI, anicteric, mucosa moist  Respiratory: airway patent, respirations unlabored  CVS: regular rate and rhythm  Abdomen: soft, mild TTP in RLQ, ND   Extremities: no edema, sensation and movement grossly intact  Skin: warm, dry, appropriate color

## 2020-12-28 LAB
ALBUMIN SERPL ELPH-MCNC: 4.1 G/DL — SIGNIFICANT CHANGE UP (ref 3.3–5)
ALP SERPL-CCNC: 77 U/L — SIGNIFICANT CHANGE UP (ref 40–120)
ALT FLD-CCNC: 23 U/L — SIGNIFICANT CHANGE UP (ref 4–41)
ANION GAP SERPL CALC-SCNC: 13 MMOL/L — SIGNIFICANT CHANGE UP (ref 7–14)
AST SERPL-CCNC: 16 U/L — SIGNIFICANT CHANGE UP (ref 4–40)
BILIRUB SERPL-MCNC: 0.2 MG/DL — SIGNIFICANT CHANGE UP (ref 0.2–1.2)
BUN SERPL-MCNC: 10 MG/DL — SIGNIFICANT CHANGE UP (ref 7–23)
CALCIUM SERPL-MCNC: 10.4 MG/DL — SIGNIFICANT CHANGE UP (ref 8.4–10.5)
CHLORIDE SERPL-SCNC: 98 MMOL/L — SIGNIFICANT CHANGE UP (ref 98–107)
CO2 SERPL-SCNC: 26 MMOL/L — SIGNIFICANT CHANGE UP (ref 22–31)
CREAT SERPL-MCNC: 0.79 MG/DL — SIGNIFICANT CHANGE UP (ref 0.5–1.3)
GLUCOSE SERPL-MCNC: 114 MG/DL — HIGH (ref 70–99)
HCT VFR BLD CALC: 45.8 % — SIGNIFICANT CHANGE UP (ref 39–50)
HGB BLD-MCNC: 14.2 G/DL — SIGNIFICANT CHANGE UP (ref 13–17)
MAGNESIUM SERPL-MCNC: 2.1 MG/DL — SIGNIFICANT CHANGE UP (ref 1.6–2.6)
MCHC RBC-ENTMCNC: 24.3 PG — LOW (ref 27–34)
MCHC RBC-ENTMCNC: 31 GM/DL — LOW (ref 32–36)
MCV RBC AUTO: 78.3 FL — LOW (ref 80–100)
NRBC # BLD: 0 /100 WBCS — SIGNIFICANT CHANGE UP
NRBC # FLD: 0 K/UL — SIGNIFICANT CHANGE UP
PHOSPHATE SERPL-MCNC: 5 MG/DL — HIGH (ref 2.5–4.5)
PLATELET # BLD AUTO: 423 K/UL — HIGH (ref 150–400)
POTASSIUM SERPL-MCNC: 4.3 MMOL/L — SIGNIFICANT CHANGE UP (ref 3.5–5.3)
POTASSIUM SERPL-SCNC: 4.3 MMOL/L — SIGNIFICANT CHANGE UP (ref 3.5–5.3)
PROT SERPL-MCNC: 7.7 G/DL — SIGNIFICANT CHANGE UP (ref 6–8.3)
RBC # BLD: 5.85 M/UL — HIGH (ref 4.2–5.8)
RBC # FLD: 17.9 % — HIGH (ref 10.3–14.5)
SODIUM SERPL-SCNC: 137 MMOL/L — SIGNIFICANT CHANGE UP (ref 135–145)
WBC # BLD: 6.26 K/UL — SIGNIFICANT CHANGE UP (ref 3.8–10.5)
WBC # FLD AUTO: 6.26 K/UL — SIGNIFICANT CHANGE UP (ref 3.8–10.5)

## 2020-12-28 PROCEDURE — 99222 1ST HOSP IP/OBS MODERATE 55: CPT | Mod: GC

## 2020-12-28 PROCEDURE — 99233 SBSQ HOSP IP/OBS HIGH 50: CPT

## 2020-12-28 RX ADMIN — SODIUM CHLORIDE 100 MILLILITER(S): 9 INJECTION, SOLUTION INTRAVENOUS at 10:05

## 2020-12-28 RX ADMIN — PANTOPRAZOLE SODIUM 40 MILLIGRAM(S): 20 TABLET, DELAYED RELEASE ORAL at 11:14

## 2020-12-28 RX ADMIN — Medication 100 MILLIGRAM(S): at 01:11

## 2020-12-28 RX ADMIN — Medication 100 MILLIGRAM(S): at 17:33

## 2020-12-28 RX ADMIN — Medication 200 MILLIGRAM(S): at 22:41

## 2020-12-28 RX ADMIN — ENOXAPARIN SODIUM 40 MILLIGRAM(S): 100 INJECTION SUBCUTANEOUS at 11:15

## 2020-12-28 RX ADMIN — Medication 100 MILLIGRAM(S): at 10:05

## 2020-12-28 RX ADMIN — Medication 200 MILLIGRAM(S): at 10:06

## 2020-12-28 RX ADMIN — Medication 20 MILLIGRAM(S): at 11:13

## 2020-12-28 RX ADMIN — Medication 20 MILLIGRAM(S): at 17:33

## 2020-12-28 RX ADMIN — SODIUM CHLORIDE 100 MILLILITER(S): 9 INJECTION, SOLUTION INTRAVENOUS at 22:40

## 2020-12-28 RX ADMIN — Medication 20 MILLIGRAM(S): at 01:11

## 2020-12-28 NOTE — PROGRESS NOTE ADULT - SUBJECTIVE AND OBJECTIVE BOX
Surgery Progress Note    SUBJECTIVE: No acute events overnight. Pt seen and examined at bedside. Patient denies pain. No nausea, vomiting, diarrhea. -/-     Vital Signs Last 24 Hrs  T(C): 36.6 (28 Dec 2020 05:29), Max: 36.9 (27 Dec 2020 16:40)  T(F): 97.9 (28 Dec 2020 05:29), Max: 98.4 (27 Dec 2020 16:40)  HR: 77 (28 Dec 2020 05:29) (77 - 97)  BP: 113/63 (28 Dec 2020 05:29) (113/63 - 123/80)  BP(mean): --  RR: 18 (28 Dec 2020 05:29) (18 - 18)  SpO2: 98% (28 Dec 2020 05:29) (97% - 100%)    Physical Exam:  General Appearance: Appears well, in no acute distress, awake, alert, and oriented x3.  Respiratory: No labored breathing  CV: Pulse regularly present  Abdomen: Soft, mild RLQ TTP, nondistended w/o rebound tenderness or guarding. NGT in place w/ dark bilious output.  Extremities: Warm and well perfused, moving spontaneously    LABS:                        14.2   6.26  )-----------( 423      ( 28 Dec 2020 06:20 )             45.8     12-28    137  |  98  |  10  ----------------------------<  114<H>  4.3   |  26  |  0.79    Ca    10.4      28 Dec 2020 06:20  Phos  5.0     12-28  Mg     2.1     12-28    TPro  7.7  /  Alb  4.1  /  TBili  0.2  /  DBili  x   /  AST  16  /  ALT  23  /  AlkPhos  77  12-28    PT/INR - ( 27 Dec 2020 01:44 )   PT: 13.6 sec;   INR: 1.19 ratio         PTT - ( 27 Dec 2020 01:44 )  PTT:35.5 sec      INs and OUTs:    12-27-20 @ 07:01  -  12-28-20 @ 07:00  --------------------------------------------------------  IN: 2700 mL / OUT: 2350 mL / NET: 350 mL        Medications:  MEDICATIONS  (STANDING):  ciprofloxacin   IVPB 400 milliGRAM(s) IV Intermittent every 12 hours  enoxaparin Injectable 40 milliGRAM(s) SubCutaneous daily  lactated ringers. 1000 milliLiter(s) (100 mL/Hr) IV Continuous <Continuous>  methylPREDNISolone sodium succinate Injectable 20 milliGRAM(s) IV Push every 8 hours  metroNIDAZOLE  IVPB      metroNIDAZOLE  IVPB 500 milliGRAM(s) IV Intermittent every 8 hours  pantoprazole  Injectable 40 milliGRAM(s) IV Push daily    MEDICATIONS  (PRN):  benzocaine 15 mG/menthol 3.6 mG (Sugar-Free) Lozenge 1 Lozenge Oral two times a day PRN Sore Throat

## 2020-12-28 NOTE — PROGRESS NOTE ADULT - ASSESSMENT
25M hx Crohns disease presenting with abd. pain x3 days founds to have SBO secondary to Crohns stricture hemodynamically stable on floors with pain controlled    - NPO/NGT/IVF  - per GI recs: steroids solumedrol 20mg q 8 hours.   - remicade initiation 12/30/20  - f/u stool cultures (once pt has BM)    Surgery Team A [id number]

## 2020-12-28 NOTE — CHART NOTE - NSCHARTNOTEFT_GEN_A_CORE
SURGERY PROGRESS NOTE    SUBJECTIVE / 24H EVENTS:  Patient seen and examined on morning rounds. NGT curled in stomach on CXR, pulled back several centimeters at bedside this morning. GI function (--)      OBJECTIVE:  VITAL SIGNS:  T(C): 36.7 (12-27-20 @ 06:30), Max: 36.9 (12-27-20 @ 04:21)  HR: 90 (12-27-20 @ 06:30) (90 - 111)  BP: 121/77 (12-27-20 @ 06:30) (119/74 - 124/100)  RR: 18 (12-27-20 @ 06:30) (18 - 20)  SpO2: 100% (12-27-20 @ 06:30) (99% - 100%)  Daily Height in cm: 185.42 (26 Dec 2020 23:55)    Daily       PHYSICAL EXAM:  Gen: NAD  LS: Respirations unlabored   GI: Soft. Nondistended. Mildly TTP in RLQ. NGT in place to LCWS with bilious output  Ext: Warm, well perfused        LAB VALUES:  12-27    140  |  102  |  10  ----------------------------<  107<H>  3.5   |  25  |  0.83    Ca    10.3      27 Dec 2020 01:44    TPro  7.5  /  Alb  4.0  /  TBili  0.2  /  DBili  x   /  AST  28  /  ALT  32  /  AlkPhos  78  12-27                               14.5   14.30 )-----------( 426      ( 27 Dec 2020 01:44 )             46.1     LIVER FUNCTIONS - ( 27 Dec 2020 01:44 )  Alb: 4.0 g/dL / Pro: 7.5 g/dL / ALK PHOS: 78 U/L / ALT: 32 U/L / AST: 28 U/L / GGT: x           PT/INR - ( 27 Dec 2020 01:44 )   PT: 13.6 sec;   INR: 1.19 ratio         PTT - ( 27 Dec 2020 01:44 )  PTT:35.5 sec            MICROBIOLOGY:      RADIOLOGY:  PACS Image: Image(s) Available (12-27-20 @ 02:49)  PACS Image: Image(s) Available (12-27-20 @ 01:42)        MEDICATIONS  (STANDING):  enoxaparin Injectable 40 milliGRAM(s) SubCutaneous daily  lactated ringers. 1000 milliLiter(s) (100 mL/Hr) IV Continuous <Continuous>    MEDICATIONS  (PRN):         ASSESSMENT/PLAN:  25 y.o Crohns disease presenting with abd. pain x3 days founds to have SBO secondary to Crohns stricture     - f/u CXR to confirm NGT after adjustment  - NPO/IVF/NGT  - OK for cepacol lozenges  - IV Cipro/Flagyl  - GI consult     Plan discussed with Attending, Dr. Garland.     A Team Surgery  [id number]
CAPRINI SCORE    AGE RELATED RISK FACTORS                                                             [ ] Age 41-60 years                                            (1 Point)  [ ] Age: 61-74 years                                           (2 Points)                 [ ] Age= 75 years                                                (3 Points)             DISEASE RELATED RISK FACTORS                                                       [ ] Edema in the lower extremities                 (1 Point)                     [ ] Varicose veins                                               (1 Point)                                 [ ] BMI > 25 Kg/m2                                            (1 Point)                                  [ ] Serious infection (ie PNA)                            (1 Point)                     [ ] Lung disease ( COPD, Emphysema)            (1 Point)                                                                          [ ] Acute myocardial infarction                         (1 Point)                  [ ] Congestive heart failure (in the previous month)  (1 Point)         [x] Inflammatory bowel disease                            (1 Point)                  [ ] Central venous access, PICC or Port               (2 points)       (within the last month)                                                                [ ] Stroke (in the previous month)                        (5 Points)    [ ] Previous or present malignancy                       (2 points)                                                                                                                                                         HEMATOLOGY RELATED FACTORS                                                         [ ] Prior episodes of VTE                                     (3 Points)                     [ ] Positive family history for VTE                      (3 Points)                  [ ] Prothrombin 72950 A                                     (3 Points)                     [ ] Factor V Leiden                                                (3 Points)                        [ ] Lupus anticoagulants                                      (3 Points)                                                           [ ] Anticardiolipin antibodies                              (3 Points)                                                       [ ] High homocysteine in the blood                   (3 Points)                                             [ ] Other congenital or acquired thrombophilia      (3 Points)                                                [ ] Heparin induced thrombocytopenia                  (3 Points)                                        MOBILITY RELATED FACTORS  [ ] Bed rest                                                         (1 Point)  [ ] Plaster cast                                                    (2 points)  [ ] Bed bound for more than 72 hours           (2 Points)    GENDER SPECIFIC FACTORS  [ ] Pregnancy or had a baby within the last month   (1 Point)  [ ] Post-partum < 6 weeks                                   (1 Point)  [ ] Hormonal therapy  or oral contraception   (1 Point)  [ ] History of pregnancy complications              (1 point)  [ ] Unexplained or recurrent              (1 Point)    OTHER RISK FACTORS                                           (1 Point)  BMI >40, smoking, diabetes requiring insulin, chemotherapy  blood transfusions and length of surgery over 2 hours    SURGERY RELATED RISK FACTORS  [ ]  Section within the last month     (1 Point)  [ ] Minor surgery                                                  (1 Point)  [ ] Arthroscopic surgery                                       (2 Points)  [ ] Planned major surgery lasting more            (2 Points)      than 45 minutes     [ ] Elective hip or knee joint replacement       (5 points)       surgery                                                TRAUMA RELATED RISK FACTORS  [ ] Fracture of the hip, pelvis, or leg                       (5 Points)  [ ] Spinal cord injury resulting in paralysis             (5 points)       (in the previous month)    [ ] Paralysis  (less than 1 month)                             (5 Points)  [ ] Multiple Trauma within 1 month                        (5 Points)    Total Score [    1    ]    Caprini Score 0-2: Low Risk, NO VTE prophylaxis required for most patients, encourage ambulation  Caprini Score 3-6: Moderate Risk , pharmacologic VTE prophylaxis is indicated for most patients (in the absence of contraindications)  Caprini Score Greater than or =7: High risk, pharmacologic VTE prophylaxis indicated for most patients (in the absence of contraindications)

## 2020-12-29 ENCOUNTER — TRANSCRIPTION ENCOUNTER (OUTPATIENT)
Age: 25
End: 2020-12-29

## 2020-12-29 LAB
ANION GAP SERPL CALC-SCNC: 7 MMOL/L — SIGNIFICANT CHANGE UP (ref 7–14)
BUN SERPL-MCNC: 12 MG/DL — SIGNIFICANT CHANGE UP (ref 7–23)
CALCIUM SERPL-MCNC: 9.7 MG/DL — SIGNIFICANT CHANGE UP (ref 8.4–10.5)
CHLORIDE SERPL-SCNC: 102 MMOL/L — SIGNIFICANT CHANGE UP (ref 98–107)
CO2 SERPL-SCNC: 30 MMOL/L — SIGNIFICANT CHANGE UP (ref 22–31)
CREAT SERPL-MCNC: 0.75 MG/DL — SIGNIFICANT CHANGE UP (ref 0.5–1.3)
GLUCOSE SERPL-MCNC: 100 MG/DL — HIGH (ref 70–99)
HCT VFR BLD CALC: 38.9 % — LOW (ref 39–50)
HGB BLD-MCNC: 12 G/DL — LOW (ref 13–17)
MAGNESIUM SERPL-MCNC: 2 MG/DL — SIGNIFICANT CHANGE UP (ref 1.6–2.6)
MCHC RBC-ENTMCNC: 24.8 PG — LOW (ref 27–34)
MCHC RBC-ENTMCNC: 30.8 GM/DL — LOW (ref 32–36)
MCV RBC AUTO: 80.4 FL — SIGNIFICANT CHANGE UP (ref 80–100)
NRBC # BLD: 0 /100 WBCS — SIGNIFICANT CHANGE UP
NRBC # FLD: 0 K/UL — SIGNIFICANT CHANGE UP
PHOSPHATE SERPL-MCNC: 4.4 MG/DL — SIGNIFICANT CHANGE UP (ref 2.5–4.5)
PLATELET # BLD AUTO: 425 K/UL — HIGH (ref 150–400)
POTASSIUM SERPL-MCNC: 4.2 MMOL/L — SIGNIFICANT CHANGE UP (ref 3.5–5.3)
POTASSIUM SERPL-SCNC: 4.2 MMOL/L — SIGNIFICANT CHANGE UP (ref 3.5–5.3)
RBC # BLD: 4.84 M/UL — SIGNIFICANT CHANGE UP (ref 4.2–5.8)
RBC # FLD: 18.1 % — HIGH (ref 10.3–14.5)
SODIUM SERPL-SCNC: 139 MMOL/L — SIGNIFICANT CHANGE UP (ref 135–145)
WBC # BLD: 7.48 K/UL — SIGNIFICANT CHANGE UP (ref 3.8–10.5)
WBC # FLD AUTO: 7.48 K/UL — SIGNIFICANT CHANGE UP (ref 3.8–10.5)

## 2020-12-29 PROCEDURE — 99232 SBSQ HOSP IP/OBS MODERATE 35: CPT | Mod: GC

## 2020-12-29 PROCEDURE — 99233 SBSQ HOSP IP/OBS HIGH 50: CPT

## 2020-12-29 RX ORDER — INFLIXIMAB-DYYB 120 MG/ML
600 INJECTION SUBCUTANEOUS ONCE
Refills: 0 | Status: DISCONTINUED | OUTPATIENT
Start: 2020-12-29 | End: 2020-12-29

## 2020-12-29 RX ORDER — DEXTROSE MONOHYDRATE, SODIUM CHLORIDE, AND POTASSIUM CHLORIDE 50; .745; 4.5 G/1000ML; G/1000ML; G/1000ML
1000 INJECTION, SOLUTION INTRAVENOUS
Refills: 0 | Status: DISCONTINUED | OUTPATIENT
Start: 2020-12-29 | End: 2020-12-30

## 2020-12-29 RX ORDER — INFLIXIMAB-DYYB 120 MG/ML
600 INJECTION SUBCUTANEOUS ONCE
Refills: 0 | Status: COMPLETED | OUTPATIENT
Start: 2020-12-29 | End: 2020-12-29

## 2020-12-29 RX ADMIN — DEXTROSE MONOHYDRATE, SODIUM CHLORIDE, AND POTASSIUM CHLORIDE 75 MILLILITER(S): 50; .745; 4.5 INJECTION, SOLUTION INTRAVENOUS at 12:18

## 2020-12-29 RX ADMIN — INFLIXIMAB-DYYB 125 MILLIGRAM(S): 120 INJECTION SUBCUTANEOUS at 21:42

## 2020-12-29 RX ADMIN — Medication 20 MILLIGRAM(S): at 09:57

## 2020-12-29 RX ADMIN — PANTOPRAZOLE SODIUM 40 MILLIGRAM(S): 20 TABLET, DELAYED RELEASE ORAL at 12:18

## 2020-12-29 RX ADMIN — Medication 100 MILLIGRAM(S): at 08:24

## 2020-12-29 RX ADMIN — Medication 20 MILLIGRAM(S): at 01:22

## 2020-12-29 RX ADMIN — Medication 25 MILLIGRAM(S): at 21:41

## 2020-12-29 RX ADMIN — Medication 100 MILLIGRAM(S): at 00:30

## 2020-12-29 RX ADMIN — Medication 200 MILLIGRAM(S): at 09:57

## 2020-12-29 RX ADMIN — DEXTROSE MONOHYDRATE, SODIUM CHLORIDE, AND POTASSIUM CHLORIDE 75 MILLILITER(S): 50; .745; 4.5 INJECTION, SOLUTION INTRAVENOUS at 23:49

## 2020-12-29 RX ADMIN — DEXTROSE MONOHYDRATE, SODIUM CHLORIDE, AND POTASSIUM CHLORIDE 75 MILLILITER(S): 50; .745; 4.5 INJECTION, SOLUTION INTRAVENOUS at 06:49

## 2020-12-29 RX ADMIN — Medication 100 MILLIGRAM(S): at 17:11

## 2020-12-29 NOTE — PROGRESS NOTE ADULT - ASSESSMENT
25M hx Crohns disease presenting with abd. pain x3 days founds to have SBO secondary to Crohns stricture hemodynamically stable on floors with partial return of bowel function    - NPO/IVF, possible adv. to clears today  - per GI recs: steroids solumedrol 20mg q 8 hours.   - remicade initiation 12/30/20 (or earlier, per GI)  - OOB, ambulating as tolerated  - f/u stool cultures (once pt has BM)    Surgery Team A [phone]

## 2020-12-29 NOTE — DISCHARGE NOTE PROVIDER - HOSPITAL COURSE
This patient is a 25 year old male with PMH Crohn's Disease on Remicade (last infusion ~1 month ago), recent admission for SBO who presented to [facility] on 12/27/20 with abdominal pain x 3 days. Pt reports epigastric and periumbilical "pressure" described as waxing and waning and worsening. Pt states last BM today, normal per pt, denies any bloody or black and tarry stools. Pt admits to 4-5 episodes of nonbloody nonbilious emesis. Pt reports similar episode to past SBO. Pt states he's not passing flatus. Denied any history of abdominal surgeries in the past. Denied any fevers, chest pain, shortness of breath, diarrhea, bloody stools, recent trauma or travel. Patient has upper and lower endoscopy a week ago at 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 Denied any additional complaints. In ED, patient had a CT abdomen and pelvis with IV contrast which revealed the following: Active inflammatory bowel disease in the right lower quadrant and pelvis with associated strictures resultant partial upstream small bowel obstruction, in keeping with history of Crohn's disease. No fluid collection or abscess. Patient admitted to colorectal surgery service and was made NPO with NGT in place, and started on IV fluids and IV antibiotics. GI consulted for SB Crohn's and steroid recommendations. Patient started on IV steroid with solumedrol. Hospital Day #2, patient had return of bowel function having passed flatus. NGT clamp trial revealed minimal residual output, so NGT removed. HD#3 patient's diet advanced to clears, then later low residual diet for continued bowel function and PO tolerance. Patient started on oral prednisone and received remicade infusion 12/29***.    *** At this time, patient is currently ambulating, voiding, tolerating a regular diet. Pain well controlled on PO pain meds. Patient has been deemed stable for discharge home with follow up as an outpatient.    This patient is a 25 year old male with PMH Crohn's Disease on Remicade (last infusion ~1 month ago), recent admission for SBO who presented to [facility] on 12/27/20 with abdominal pain x 3 days. Pt reports epigastric and periumbilical "pressure" described as waxing and waning and worsening. Pt states last BM today, normal per pt, denies any bloody or black and tarry stools. Pt admits to 4-5 episodes of nonbloody nonbilious emesis. Pt reports similar episode to past SBO. Pt states he's not passing flatus. Denied any history of abdominal surgeries in the past. Denied any fevers, chest pain, shortness of breath, diarrhea, bloody stools, recent trauma or travel. 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 Denied any additional complaints. In ED, patient had a CT abdomen and pelvis with IV contrast which revealed the following: Active inflammatory bowel disease in the right lower quadrant and pelvis with associated strictures resultant partial upstream small bowel obstruction, in keeping with history of Crohn's disease. No fluid collection or abscess. Patient admitted to colorectal surgery service and was made NPO with NGT in place, and started on IV fluids and IV antibiotics. GI consulted for SB Crohn's and steroid recommendations. Patient started on IV steroid with solumedrol. Hospital Day #2, patient had return of bowel function having passed flatus. NGT clamp trial revealed minimal residual output, so NGT removed. HD#3 patient's diet advanced to clears, then later low residual diet for continued bowel function and PO tolerance. Patient started on oral prednisone and received remicade infusion 12/29***.     At this time, patient is currently ambulating, voiding, tolerating a regular diet. Pain well controlled on PO pain meds. Patient has been deemed stable for discharge home with follow up as an outpatient.

## 2020-12-29 NOTE — PROGRESS NOTE ADULT - SUBJECTIVE AND OBJECTIVE BOX
Surgery Progress Note    SUBJECTIVE: No acute events overnight. Over 24hrs, patient had successful clamp trial. NGT d/c'ed. Pt seen and examined at bedside. Patient comfortable, endorses feeling "good." No nausea, vomiting, diarrhea. +Flatus/-BM.    Vital Signs Last 24 Hrs  T(C): 36.7 (29 Dec 2020 06:48), Max: 36.7 (28 Dec 2020 10:37)  T(F): 98.1 (29 Dec 2020 06:48), Max: 98.1 (29 Dec 2020 06:48)  HR: 84 (29 Dec 2020 06:48) (73 - 97)  BP: 104/62 (29 Dec 2020 06:48) (98/51 - 122/72)  BP(mean): --  RR: 18 (29 Dec 2020 06:48) (17 - 18)  SpO2: 98% (29 Dec 2020 06:48) (98% - 100%)    Physical Exam:  General Appearance: Appears well, in no acute distress, awake, alert, and oriented x3.  Respiratory: No labored breathing  CV: Pulse regularly present  Abdomen: Soft, nontender, nondistended w/o rebound tenderness or guarding.   Extremities: Warm and well perfused, moving spontaneously    LABS:                        14.2   6.26  )-----------( 423      ( 28 Dec 2020 06:20 )             45.8     12-28    137  |  98  |  10  ----------------------------<  114<H>  4.3   |  26  |  0.79    Ca    10.4      28 Dec 2020 06:20  Phos  5.0     12-28  Mg     2.1     12-28    TPro  7.7  /  Alb  4.1  /  TBili  0.2  /  DBili  x   /  AST  16  /  ALT  23  /  AlkPhos  77  12-28          INs and OUTs:    12-28-20 @ 07:01  -  12-29-20 @ 07:00  --------------------------------------------------------  IN: 1700 mL / OUT: 2150 mL / NET: -450 mL        Medications:  MEDICATIONS  (STANDING):  ciprofloxacin   IVPB 400 milliGRAM(s) IV Intermittent every 12 hours  dextrose 5% + sodium chloride 0.45% with potassium chloride 20 mEq/L 1000 milliLiter(s) (75 mL/Hr) IV Continuous <Continuous>  enoxaparin Injectable 40 milliGRAM(s) SubCutaneous daily  methylPREDNISolone sodium succinate Injectable 20 milliGRAM(s) IV Push every 8 hours  metroNIDAZOLE  IVPB      metroNIDAZOLE  IVPB 500 milliGRAM(s) IV Intermittent every 8 hours  pantoprazole  Injectable 40 milliGRAM(s) IV Push daily    MEDICATIONS  (PRN):  benzocaine 15 mG/menthol 3.6 mG (Sugar-Free) Lozenge 1 Lozenge Oral two times a day PRN Sore Throat

## 2020-12-29 NOTE — PROGRESS NOTE ADULT - ASSESSMENT
24 yo M w/ PMHx recently diagnosed small bowel Crohn's disease (stricturing, recent SBO 11/2020 treated w/ steroids/remicade initiation due for 6 week infusion 12/30/20) presenting w/ abd pain and nbnb emesis x 3d, found to have recurrent SBO    Impression:  # CD - small bowel CD, recent normal colonsocopy, MRE showing active disease. Presenting w/ SBO. Inflammatory markers mildly elevated (though improved from prior), s/p NGT decompression now with removal on 12/28 and passing gas    Recommendations:   - c/w steroids solumedrol 20mg q 8 hours; if able to tolerate diet today can switch to PO prednisone  - will dose remicade today for 6 week induction dose 10mg/kg  - status discussed with patient's GI Dr Alfie Griffiths yesterday  - rest of care per primary team      Sonia Rothman PGY-4  Gastroenterology Fellow  Pager #[phone] or [phone]  Please page on-call Fellow on weekends/after 5 pm on weekdays   Please call answering service for on-call GI fellow ([phone]) after 5pm and before 8am, and on weekends.

## 2020-12-29 NOTE — DISCHARGE NOTE PROVIDER - DETAILS OF MALNUTRITION DIAGNOSIS/DIAGNOSES
This patient has been assessed with a concern for Malnutrition and was treated during this hospitalization for the following Nutrition diagnosis/diagnoses:     -  12/27/2020: Severe protein-calorie malnutrition   This patient has been assessed with a concern for Malnutrition and was treated during this hospitalization for the following Nutrition diagnosis/diagnoses:     -  12/27/2020: Severe protein-calorie malnutrition    This patient has been assessed with a concern for Malnutrition and was treated during this hospitalization for the following Nutrition diagnosis/diagnoses:     -  12/27/2020: Severe protein-calorie malnutrition   This patient has been assessed with a concern for Malnutrition and was treated during this hospitalization for the following Nutrition diagnosis/diagnoses:     -  12/27/2020: Severe protein-calorie malnutrition    This patient has been assessed with a concern for Malnutrition and was treated during this hospitalization for the following Nutrition diagnosis/diagnoses:     -  12/27/2020: Severe protein-calorie malnutrition    This patient has been assessed with a concern for Malnutrition and was treated during this hospitalization for the following Nutrition diagnosis/diagnoses:     -  12/27/2020: Severe protein-calorie malnutrition

## 2020-12-29 NOTE — PROGRESS NOTE ADULT - SUBJECTIVE AND OBJECTIVE BOX
Chief Complaint:  Patient is a 25y old  Male who presents with a chief complaint of Crohns with SBO (29 Dec 2020 07:42)      Interval Events: overall improved, passing gas  - NGT removed yesterday evening  - on clears today      Hospital Medications:  ciprofloxacin   IVPB 400 milliGRAM(s) IV Intermittent every 12 hours  dextrose 5% + sodium chloride 0.45% with potassium chloride 20 mEq/L 1000 milliLiter(s) IV Continuous <Continuous>  enoxaparin Injectable 40 milliGRAM(s) SubCutaneous daily  methylPREDNISolone sodium succinate Injectable 20 milliGRAM(s) IV Push every 8 hours  metroNIDAZOLE  IVPB      metroNIDAZOLE  IVPB 500 milliGRAM(s) IV Intermittent every 8 hours  pantoprazole  Injectable 40 milliGRAM(s) IV Push daily      PMHX/PSHX:  Crohn disease    No significant past surgical history            ROS:     General:  No weight loss, fevers, chills, night sweats, fatigue   Eyes:  No vision changes  ENT:  No sore throat, pain, runny nose  CV:  No chest pain, palpitations, dizziness   Resp:  No SOB, cough, wheezing  GI:  See HPI  :  No burning with urination, hematuria  Muscle:  No pain, weakness  Neuro:  No weakness/tingling, memory problems  Psych:  No fatigue, insomnia, mood problems, depression  Heme:  No easy bruisability  Skin:  No rash, edema      PHYSICAL EXAM:     GENERAL:  thin, no distress  HEENT:  NC/AT,  conjunctivae clear, sclera anicteric  CHEST:  Full & symmetric excursion, no increased effort w/ respirations  HEART:  Regular rhythm & rate  ABDOMEN:  Soft, non-tender, non-distended  EXTREMITIES:  no LE  edema  SKIN:  No rash/erythema/ecchymoses/petechiae/wounds/jaundice  NEURO:  Alert, oriented    Vital Signs:  Vital Signs Last 24 Hrs  T(C): 36.4 (29 Dec 2020 10:06), Max: 36.7 (29 Dec 2020 06:48)  T(F): 97.5 (29 Dec 2020 10:06), Max: 98.1 (29 Dec 2020 06:48)  HR: 67 (29 Dec 2020 10:06) (67 - 91)  BP: 108/60 (29 Dec 2020 10:06) (98/51 - 122/72)  BP(mean): --  RR: 18 (29 Dec 2020 10:06) (17 - 18)  SpO2: 100% (29 Dec 2020 10:06) (98% - 100%)  Daily     Daily     LABS:                        12.0   7.48  )-----------( 425      ( 29 Dec 2020 07:19 )             38.9     12-29    139  |  102  |  12  ----------------------------<  100<H>  4.2   |  30  |  0.75    Ca    9.7      29 Dec 2020 07:19  Phos  4.4     12-29  Mg     2.0     12-29    TPro  7.7  /  Alb  4.1  /  TBili  0.2  /  DBili  x   /  AST  16  /  ALT  23  /  AlkPhos  77  12-28    LIVER FUNCTIONS - ( 28 Dec 2020 06:20 )  Alb: 4.1 g/dL / Pro: 7.7 g/dL / ALK PHOS: 77 U/L / ALT: 23 U/L / AST: 16 U/L / GGT: x                   Imaging:

## 2020-12-29 NOTE — DISCHARGE NOTE PROVIDER - CARE PROVIDERS DIRECT ADDRESSES
Date: 7/11/2022    Patient Name: Margaret Mcdonald          To Whom it may concern: This letter has been written at the patient's request. The above patient was seen at the [facility] for treatment of a medical condition. This patient should be excused from attending work/school from 7/11/2022 to reassessed by the [address] [place] [address] burn clinic on 7/15/2022.         Sincerely,        Helen Daniels DO ,[email or web address]

## 2020-12-29 NOTE — DISCHARGE NOTE PROVIDER - NSDCFUADDINST_GEN_ALL_CORE_FT
ACTIVITY: No heavy lifting or straining. Otherwise, you may return to your usual level of physical activity. If you are taking narcotic pain medication (such as Percocet) DO NOT drive a car, operate machinery or make important decisions.  DIET: Return to your usual diet.  NOTIFY YOUR SURGEON IF: any fever (over 100.4 F) or chills, persistent nausea/vomiting, persistent diarrhea, or if your pain is not controlled on your discharge pain medications.  FOLLOW-UP: Please follow up with your primary care physician in one week regarding your hospitalization  ACTIVITY: you may return to your usual level of physical activity. If you are taking narcotic pain medication (such as Percocet) DO NOT drive a car, operate machinery or make important decisions.  DIET: Return to your usual diet.  NOTIFY YOUR SURGEON IF: any fever (over 100.4 F) or chills, persistent nausea/vomiting, persistent diarrhea, or if your pain is not controlled on your discharge pain medications.  FOLLOW-UP: Please follow up with your primary care physician in one week regarding your hospitalization

## 2020-12-29 NOTE — DISCHARGE NOTE PROVIDER - NSDCMRMEDTOKEN_GEN_ALL_CORE_FT
Remicade 100 mg intravenous injection: 1 dose(s) intravenous once a month   predniSONE 20 mg oral tablet: 2 tab(s) orally once a day MDD:1  Remicade 100 mg intravenous injection: 1 dose(s) intravenous once a month

## 2020-12-29 NOTE — DISCHARGE NOTE PROVIDER - CARE PROVIDER_API CALL
Alex Garland  COLON/RECTAL SURGERY  [address] [place] [address], [address]  [place] [address]  Phone: [phone]  Fax: [phone]  Follow Up Time: 2 weeks

## 2020-12-30 ENCOUNTER — TRANSCRIPTION ENCOUNTER (OUTPATIENT)
Age: 25
End: 2020-12-30

## 2020-12-30 VITALS
SYSTOLIC BLOOD PRESSURE: 115 MMHG | OXYGEN SATURATION: 100 % | RESPIRATION RATE: 18 BRPM | HEART RATE: 67 BPM | TEMPERATURE: 98 F | DIASTOLIC BLOOD PRESSURE: 65 MMHG

## 2020-12-30 LAB
ANION GAP SERPL CALC-SCNC: 9 MMOL/L — SIGNIFICANT CHANGE UP (ref 7–14)
BUN SERPL-MCNC: 9 MG/DL — SIGNIFICANT CHANGE UP (ref 7–23)
CALCIUM SERPL-MCNC: 9.8 MG/DL — SIGNIFICANT CHANGE UP (ref 8.4–10.5)
CHLORIDE SERPL-SCNC: 104 MMOL/L — SIGNIFICANT CHANGE UP (ref 98–107)
CO2 SERPL-SCNC: 28 MMOL/L — SIGNIFICANT CHANGE UP (ref 22–31)
CREAT SERPL-MCNC: 0.74 MG/DL — SIGNIFICANT CHANGE UP (ref 0.5–1.3)
GLUCOSE SERPL-MCNC: 111 MG/DL — HIGH (ref 70–99)
HCT VFR BLD CALC: 44.8 % — SIGNIFICANT CHANGE UP (ref 39–50)
HGB BLD-MCNC: 13.7 G/DL — SIGNIFICANT CHANGE UP (ref 13–17)
MAGNESIUM SERPL-MCNC: 2 MG/DL — SIGNIFICANT CHANGE UP (ref 1.6–2.6)
MCHC RBC-ENTMCNC: 24.5 PG — LOW (ref 27–34)
MCHC RBC-ENTMCNC: 30.6 GM/DL — LOW (ref 32–36)
MCV RBC AUTO: 80.1 FL — SIGNIFICANT CHANGE UP (ref 80–100)
NRBC # BLD: 0 /100 WBCS — SIGNIFICANT CHANGE UP
NRBC # FLD: 0 K/UL — SIGNIFICANT CHANGE UP
PHOSPHATE SERPL-MCNC: 3.7 MG/DL — SIGNIFICANT CHANGE UP (ref 2.5–4.5)
PLATELET # BLD AUTO: 428 K/UL — HIGH (ref 150–400)
POTASSIUM SERPL-MCNC: 3.9 MMOL/L — SIGNIFICANT CHANGE UP (ref 3.5–5.3)
POTASSIUM SERPL-SCNC: 3.9 MMOL/L — SIGNIFICANT CHANGE UP (ref 3.5–5.3)
RBC # BLD: 5.59 M/UL — SIGNIFICANT CHANGE UP (ref 4.2–5.8)
RBC # FLD: 18.4 % — HIGH (ref 10.3–14.5)
SODIUM SERPL-SCNC: 141 MMOL/L — SIGNIFICANT CHANGE UP (ref 135–145)
WBC # BLD: 8.94 K/UL — SIGNIFICANT CHANGE UP (ref 3.8–10.5)
WBC # FLD AUTO: 8.94 K/UL — SIGNIFICANT CHANGE UP (ref 3.8–10.5)

## 2020-12-30 PROCEDURE — 99233 SBSQ HOSP IP/OBS HIGH 50: CPT

## 2020-12-30 RX ORDER — PANTOPRAZOLE SODIUM 20 MG/1
40 TABLET, DELAYED RELEASE ORAL
Refills: 0 | Status: DISCONTINUED | OUTPATIENT
Start: 2020-12-30 | End: 2020-12-30

## 2020-12-30 RX ADMIN — Medication 100 MILLIGRAM(S): at 00:28

## 2020-12-30 RX ADMIN — Medication 25 MILLIGRAM(S): at 05:20

## 2020-12-30 RX ADMIN — Medication 15 MILLIGRAM(S): at 10:27

## 2020-12-30 RX ADMIN — Medication 100 MILLIGRAM(S): at 08:08

## 2020-12-30 RX ADMIN — Medication 200 MILLIGRAM(S): at 10:26

## 2020-12-30 RX ADMIN — Medication 200 MILLIGRAM(S): at 00:26

## 2020-12-30 RX ADMIN — PANTOPRAZOLE SODIUM 40 MILLIGRAM(S): 20 TABLET, DELAYED RELEASE ORAL at 08:06

## 2020-12-30 NOTE — PROGRESS NOTE ADULT - SUBJECTIVE AND OBJECTIVE BOX
SURGERY DAILY PROGRESS NOTE:     INTERVAL: no acute events overnight.     SUBJECTIVE/ROS: Patient feels well. +/+.   Denies nausea, vomiting, chest pain, shortness of breath     MEDICATIONS  (STANDING):  ciprofloxacin   IVPB 400 milliGRAM(s) IV Intermittent every 12 hours  enoxaparin Injectable 40 milliGRAM(s) SubCutaneous daily  metroNIDAZOLE  IVPB      metroNIDAZOLE  IVPB 500 milliGRAM(s) IV Intermittent every 8 hours  pantoprazole    Tablet 40 milliGRAM(s) Oral before breakfast  predniSONE   Tablet 15 milliGRAM(s) Oral once    MEDICATIONS  (PRN):      OBJECTIVE:    Vital Signs Last 24 Hrs  T(C): 36.4 (30 Dec 2020 05:19), Max: 36.7 (29 Dec 2020 16:56)  T(F): 97.5 (30 Dec 2020 05:19), Max: 98 (29 Dec 2020 16:56)  HR: 67 (30 Dec 2020 05:19) (61 - 74)  BP: 115/65 (30 Dec 2020 05:19) (103/67 - 115/65)  BP(mean): --  RR: 18 (30 Dec 2020 05:19) (17 - 18)  SpO2: 100% (30 Dec 2020 05:19) (99% - 100%)    I&O's Detail    29 Dec 2020 07:01  -  30 Dec 2020 07:00  --------------------------------------------------------  IN:  Total IN: 0 mL    OUT:    Voided (mL): 1000 mL  Total OUT: 1000 mL    Total NET: -1000 mL          Daily     Daily     LABS:                        13.7   8.94  )-----------( 428      ( 30 Dec 2020 07:22 )             44.8     12-30    141  |  104  |  9   ----------------------------<  111<H>  3.9   |  28  |  0.74    Ca    9.8      30 Dec 2020 07:22  Phos  3.7     12-30  Mg     2.0     12-30                    PHYSICAL EXAM:  General: AAOx3, NAD, lying comfortably in bed  Respiratory: nonlabored breathing  Abdomen: non-distended, soft, non-tender  Extremities: no edema    ASSESSMENT AND PLAN:       A Team Surgery, [id number] SURGERY DAILY PROGRESS NOTE:     INTERVAL: no acute events overnight. received remicade infusion last night. on prednisone taper from solumedrol    SUBJECTIVE/ROS: Patient feels well. +/+. Tolerating diet. OOB/ambi. Denies nausea, vomiting, chest pain, shortness of breath     MEDICATIONS  (STANDING):  ciprofloxacin   IVPB 400 milliGRAM(s) IV Intermittent every 12 hours  enoxaparin Injectable 40 milliGRAM(s) SubCutaneous daily  metroNIDAZOLE  IVPB      metroNIDAZOLE  IVPB 500 milliGRAM(s) IV Intermittent every 8 hours  pantoprazole    Tablet 40 milliGRAM(s) Oral before breakfast  predniSONE   Tablet 15 milliGRAM(s) Oral once    MEDICATIONS  (PRN):      OBJECTIVE:    Vital Signs Last 24 Hrs  T(C): 36.4 (30 Dec 2020 05:19), Max: 36.7 (29 Dec 2020 16:56)  T(F): 97.5 (30 Dec 2020 05:19), Max: 98 (29 Dec 2020 16:56)  HR: 67 (30 Dec 2020 05:19) (61 - 74)  BP: 115/65 (30 Dec 2020 05:19) (103/67 - 115/65)  BP(mean): --  RR: 18 (30 Dec 2020 05:19) (17 - 18)  SpO2: 100% (30 Dec 2020 05:19) (99% - 100%)    I&O's Detail    29 Dec 2020 07:01  -  30 Dec 2020 07:00  --------------------------------------------------------  IN:  Total IN: 0 mL    OUT:    Voided (mL): 1000 mL  Total OUT: 1000 mL    Total NET: -1000 mL          Daily     Daily     LABS:                        13.7   8.94  )-----------( 428      ( 30 Dec 2020 07:22 )             44.8     12-30    141  |  104  |  9   ----------------------------<  111<H>  3.9   |  28  |  0.74    Ca    9.8      30 Dec 2020 07:22  Phos  3.7     12-30  Mg     2.0     12-30                    PHYSICAL EXAM:  General: AAOx3, NAD, lying comfortably in bed  Respiratory: nonlabored breathing  Abdomen: non-distended, soft, non-tender  Extremities: no edema    ASSESSMENT AND PLAN:       A Team Surgery, [id number] SURGERY DAILY PROGRESS NOTE:     INTERVAL: no acute events overnight. received remicade infusion last night. on prednisone taper from solumedrol    SUBJECTIVE/ROS: Patient feels well. +/+. Tolerating diet. OOB/ambi. Denies nausea, vomiting, chest pain, shortness of breath     MEDICATIONS  (STANDING):  ciprofloxacin   IVPB 400 milliGRAM(s) IV Intermittent every 12 hours  enoxaparin Injectable 40 milliGRAM(s) SubCutaneous daily  metroNIDAZOLE  IVPB      metroNIDAZOLE  IVPB 500 milliGRAM(s) IV Intermittent every 8 hours  pantoprazole    Tablet 40 milliGRAM(s) Oral before breakfast  predniSONE   Tablet 15 milliGRAM(s) Oral once    MEDICATIONS  (PRN):      OBJECTIVE:    Vital Signs Last 24 Hrs  T(C): 36.4 (30 Dec 2020 05:19), Max: 36.7 (29 Dec 2020 16:56)  T(F): 97.5 (30 Dec 2020 05:19), Max: 98 (29 Dec 2020 16:56)  HR: 67 (30 Dec 2020 05:19) (61 - 74)  BP: 115/65 (30 Dec 2020 05:19) (103/67 - 115/65)  BP(mean): --  RR: 18 (30 Dec 2020 05:19) (17 - 18)  SpO2: 100% (30 Dec 2020 05:19) (99% - 100%)    I&O's Detail    29 Dec 2020 07:01  -  30 Dec 2020 07:00  --------------------------------------------------------  IN:  Total IN: 0 mL    OUT:    Voided (mL): 1000 mL  Total OUT: 1000 mL    Total NET: -1000 mL          Daily     Daily     LABS:                        13.7   8.94  )-----------( 428      ( 30 Dec 2020 07:22 )             44.8     12-30    141  |  104  |  9   ----------------------------<  111<H>  3.9   |  28  |  0.74    Ca    9.8      30 Dec 2020 07:22  Phos  3.7     12-30  Mg     2.0     12-30          PHYSICAL EXAM:  General: AAOx3, NAD, lying comfortably in bed  Respiratory: nonlabored breathing  Abdomen: non-distended, soft, non-tender  Extremities: no edema    ASSESSMENT AND PLAN:   25M hx Crohns disease presenting with abd. pain x3 days founds to have SBO secondary to Crohns stricture hemodynamically stable on floors with partial return of bowel function    - LRD  - prednisone  - remicade initiation received 12/29  - OOB, ambulating as tolerated  - f/u stool cultures   - likely dc today    Surgery Team A [id number]      A Team Surgery, [id number]

## 2020-12-30 NOTE — PROGRESS NOTE ADULT - ASSESSMENT
24 yo M w/ PMHx recently diagnosed small bowel Crohn's disease (stricturing, recent SBO 11/2020 treated w/ steroids/remicade initiation due for 6 week infusion 12/30/20) presenting w/ abd pain and nbnb emesis x 3d, found to have recurrent SBO    Impression:  # CD - small bowel CD, recent normal colonsocopy, MRE showing active disease. Presenting w/ SBO. Inflammatory markers mildly elevated (though improved from prior), s/p NGT decompression now with removal on 12/28 and passing gas    Recommendations:   - please change to prednisone 40mg daily  - dose infliximab yesterday; received inflectra for first dose at 0 weeks, remicade for second dose at 2 weeks and now inflectra for third dose at 6 weeks  - status discussed with patient's GI Dr Alfie Griffiths; will f/u outpatient  - f/u infliximab level and ab  - rest of care per primary team      Sonia Rothman PGY-4  Gastroenterology Fellow  Pager #[phone] or [phone]  Please page on-call Fellow on weekends/after 5 pm on weekdays   Please call answering service for on-call GI fellow ([phone]) after 5pm and before 8am, and on weekends.  24 yo M w/ PMHx recently diagnosed small bowel Crohn's disease (stricturing, recent SBO 11/2020 treated w/ steroids/remicade initiation due for 6 week infusion 12/30/20) presenting w/ abd pain and nbnb emesis x 3d, found to have recurrent SBO    Impression:  # CD - small bowel CD, recent normal colonsocopy, MRE showing active disease. Presenting w/ SBO. Inflammatory markers mildly elevated (though improved from prior), s/p NGT decompression now with removal on 12/28 and passing gas    Recommendations:   - please change to prednisone 40mg daily - to be tapered by outpatient GI  - dosed infliximab yesterday; received inflectra for first dose at 0 weeks, remicade for second dose at 2 weeks and now inflectra for third dose at 6 weeks  - status discussed with patient's GI Dr Alfie Griffiths; will f/u outpatient  - f/u infliximab level and ab  - rest of care per primary team      Sonia Rothman PGY-4  Gastroenterology Fellow  Pager #[phone] or [phone]  Please page on-call Fellow on weekends/after 5 pm on weekdays   Please call answering service for on-call GI fellow ([phone]) after 5pm and before 8am, and on weekends.

## 2020-12-30 NOTE — PROGRESS NOTE ADULT - SUBJECTIVE AND OBJECTIVE BOX
Chief Complaint:  Patient is a 25y old  Male who presents with a chief complaint of Crohns with SBO (29 Dec 2020 15:19)      Interval Events:       Hospital Medications:  ciprofloxacin   IVPB 400 milliGRAM(s) IV Intermittent every 12 hours  enoxaparin Injectable 40 milliGRAM(s) SubCutaneous daily  metroNIDAZOLE  IVPB      metroNIDAZOLE  IVPB 500 milliGRAM(s) IV Intermittent every 8 hours  pantoprazole    Tablet 40 milliGRAM(s) Oral before breakfast  predniSONE   Tablet 25 milliGRAM(s) Oral every 8 hours      PMHX/PSHX:  Crohn disease    No significant past surgical history            ROS:     General:  No weight loss, fevers, chills, night sweats, fatigue   Eyes:  No vision changes  ENT:  No sore throat, pain, runny nose  CV:  No chest pain, palpitations, dizziness   Resp:  No SOB, cough, wheezing  GI:  See HPI  :  No burning with urination, hematuria  Muscle:  No pain, weakness  Neuro:  No weakness/tingling, memory problems  Psych:  No fatigue, insomnia, mood problems, depression  Heme:  No easy bruisability  Skin:  No rash, edema      PHYSICAL EXAM:     GENERAL:  Well developed, no distress  HEENT:  NC/AT,  conjunctivae clear, sclera anicteric  CHEST:  Full & symmetric excursion, no increased effort w/ respirations  HEART:  Regular rhythm & rate  ABDOMEN:  Soft, non-tender, non-distended  EXTREMITIES:  no LE  edema  SKIN:  No rash/erythema/ecchymoses/petechiae/wounds/jaundice  NEURO:  Alert, oriented    Vital Signs:  Vital Signs Last 24 Hrs  T(C): 36.4 (30 Dec 2020 05:19), Max: 36.7 (29 Dec 2020 16:56)  T(F): 97.5 (30 Dec 2020 05:19), Max: 98 (29 Dec 2020 16:56)  HR: 67 (30 Dec 2020 05:19) (61 - 74)  BP: 115/65 (30 Dec 2020 05:19) (103/67 - 115/65)  BP(mean): --  RR: 18 (30 Dec 2020 05:19) (17 - 18)  SpO2: 100% (30 Dec 2020 05:19) (99% - 100%)  Daily     Daily     LABS:                        13.7   8.94  )-----------( 428      ( 30 Dec 2020 07:22 )             44.8     12-29    139  |  102  |  12  ----------------------------<  100<H>  4.2   |  30  |  0.75    Ca    9.7      29 Dec 2020 07:19  Phos  4.4     12-29  Mg     2.0     12-29                Imaging:             Chief Complaint:  Patient is a 25y old  Male who presents with a chief complaint of Crohns with SBO (29 Dec 2020 15:19)      Interval Events: patient doing well today, tolerating regular diet      Hospital Medications:  ciprofloxacin   IVPB 400 milliGRAM(s) IV Intermittent every 12 hours  enoxaparin Injectable 40 milliGRAM(s) SubCutaneous daily  metroNIDAZOLE  IVPB      metroNIDAZOLE  IVPB 500 milliGRAM(s) IV Intermittent every 8 hours  pantoprazole    Tablet 40 milliGRAM(s) Oral before breakfast  predniSONE   Tablet 25 milliGRAM(s) Oral every 8 hours      PMHX/PSHX:  Crohn disease    No significant past surgical history            ROS:     General:  No weight loss, fevers, chills, night sweats, fatigue   Eyes:  No vision changes  ENT:  No sore throat, pain, runny nose  CV:  No chest pain, palpitations, dizziness   Resp:  No SOB, cough, wheezing  GI:  See HPI  :  No burning with urination, hematuria  Muscle:  No pain, weakness  Neuro:  No weakness/tingling, memory problems  Psych:  No fatigue, insomnia, mood problems, depression  Heme:  No easy bruisability  Skin:  No rash, edema      PHYSICAL EXAM:     GENERAL:  Well developed, no distress  HEENT:  NC/AT,  conjunctivae clear, sclera anicteric  CHEST:  Full & symmetric excursion, no increased effort w/ respirations  HEART:  Regular rhythm & rate  ABDOMEN:  Soft, non-tender, non-distended  EXTREMITIES:  no LE  edema  SKIN:  No rash/erythema/ecchymoses/petechiae/wounds/jaundice  NEURO:  Alert, oriented    Vital Signs:  Vital Signs Last 24 Hrs  T(C): 36.4 (30 Dec 2020 05:19), Max: 36.7 (29 Dec 2020 16:56)  T(F): 97.5 (30 Dec 2020 05:19), Max: 98 (29 Dec 2020 16:56)  HR: 67 (30 Dec 2020 05:19) (61 - 74)  BP: 115/65 (30 Dec 2020 05:19) (103/67 - 115/65)  BP(mean): --  RR: 18 (30 Dec 2020 05:19) (17 - 18)  SpO2: 100% (30 Dec 2020 05:19) (99% - 100%)  Daily     Daily     LABS:                        13.7   8.94  )-----------( 428      ( 30 Dec 2020 07:22 )             44.8     12-29    139  |  102  |  12  ----------------------------<  100<H>  4.2   |  30  |  0.75    Ca    9.7      29 Dec 2020 07:19  Phos  4.4     12-29  Mg     2.0     12-29                Imaging:

## 2020-12-30 NOTE — PROGRESS NOTE ADULT - ATTENDING COMMENTS
Significantly improved-tolerating clear liquids. Passing flatus. Denies abdominal pain. Infliximab levels/antibody pending. Remicade infusion to complete induction regimen planned-10 mg/kilogram (600 mg).
pt feeling well, martha LRD, + flatus, no abd pain    aaox3   abd soft, NT/ND    SBO resolved  cont PO steroids per GI  d/c home today
+ flatus this am, no abd pain    NGT with minimal output overnight    aaox3  abd soft, NT/ND    ngt clamp trial and possible d/c later   steroids per GI

## 2020-12-30 NOTE — DISCHARGE NOTE NURSING/CASE MANAGEMENT/SOCIAL WORK - PATIENT PORTAL LINK FT
You can access the FollowMyHealth Patient Portal offered by [facility] by registering at the following website: [email or web address]. By joining [facility]’s FollowMyHealth portal, you will also be able to view your health information using other applications (apps) compatible with our system.

## 2020-12-30 NOTE — PROGRESS NOTE ADULT - NUTRITIONAL ASSESSMENT
This patient has been assessed with a concern for Malnutrition and has been determined to have a diagnosis/diagnoses of Severe protein-calorie malnutrition.    This patient is being managed with:   Diet NPO-  Entered: Dec 27 2020  4:37AM    
This patient has been assessed with a concern for Malnutrition and has been determined to have a diagnosis/diagnoses of Severe protein-calorie malnutrition.    This patient is being managed with:   Diet NPO-  Entered: Dec 27 2020  4:37AM    
This patient has been assessed with a concern for Malnutrition and has been determined to have a diagnosis/diagnoses of Severe protein-calorie malnutrition.    This patient is being managed with:   Diet Regular-  Fiber/Residue Restricted (LOWFIBER)  Entered: Dec 29 2020  1:57PM

## 2021-01-07 LAB
INFLIXIMAB AB SERPL-MCNC: <22 NG/ML — SIGNIFICANT CHANGE UP
INFLIXIMAB SERPL-MCNC: 3.9 UG/ML — SIGNIFICANT CHANGE UP

## 2021-01-21 NOTE — PATIENT PROFILE ADULT - OVER THE PAST TWO WEEKS, HAVE YOU FELT LITTLE INTEREST OR PLEASURE IN DOING THINGS?
no Melolabial Transposition Flap Text: The defect edges were debeveled with a #15c scalpel blade.  Given the location of the defect and the proximity to free margins a melolabial flap was deemed most appropriate.  Using a sterile surgical marker, an appropriate melolabial transposition flap was drawn incorporating the defect.    The area thus outlined was incised deep to adipose tissue with a #15 scalpel blade.  The skin margins were undermined to an appropriate distance in all directions utilizing iris scissors.

## 2021-01-28 ENCOUNTER — APPOINTMENT (OUTPATIENT)
Dept: COLORECTAL SURGERY | Facility: CLINIC | Age: 26
End: 2021-01-28
Payer: MEDICAID

## 2021-01-28 VITALS
OXYGEN SATURATION: 99 % | DIASTOLIC BLOOD PRESSURE: 65 MMHG | TEMPERATURE: 97.6 F | HEART RATE: 85 BPM | SYSTOLIC BLOOD PRESSURE: 109 MMHG

## 2021-01-28 PROCEDURE — 99072 ADDL SUPL MATRL&STAF TM PHE: CPT

## 2021-01-28 PROCEDURE — 99213 OFFICE O/P EST LOW 20 MIN: CPT

## 2021-01-28 RX ORDER — INFLIXIMAB 100 MG/10ML
INJECTION, POWDER, LYOPHILIZED, FOR SOLUTION INTRAVENOUS
Refills: 0 | Status: ACTIVE | COMMUNITY

## 2021-01-28 RX ORDER — AZATHIOPRINE 100 MG/1
TABLET ORAL
Refills: 0 | Status: ACTIVE | COMMUNITY

## 2021-01-28 NOTE — ASSESSMENT
[FreeTextEntry1] : Crohn's disease\par -Patient with scattered jejunal ileal Crohn's disease. He has been subtherapeutic on medical management, however imaging showed repeated proximal bowel dilation. Given the location of disease I recommended optimization of biologic therapy. Patient will continue increased dosage and decrease schedule of Remicade\par -He will call the office with symptomatic changes\par -Patient to followup in office in 6 weeks. Would likely will repeat the MRI exam at that time to evaluate for improved inflammation and bowel dilation\par -If no improvement is noted, will consider surgical intervention which would likely require stricturoplasty and small bowel resections\par -The case was Discussed with Dr. Griffiths of GI at [facility]\par -All questions were answered

## 2021-01-28 NOTE — HISTORY OF PRESENT ILLNESS
[FreeTextEntry1] : 25-year-old male with recent diagnosis of Crohn's disease. Patient was admitted to [facility] in November of 2020 with a small bowel obstruction treated with anti-inflammatory medications. He was given steroids and initial dose of Remicade. Patient was readmitted to [facility] in December with similar symptoms. His imaging at that time was also similar to previous images. His gastroenterologist is at [facility] and had noted his Remicade levels were not therapeutic. He had an increased dose one week ago and has been increased to q.4 week administration. He currently is asymptomatic denies nausea or vomiting. Tolerating diet. No fevers or chills normal bowel movements.

## 2021-03-11 ENCOUNTER — APPOINTMENT (OUTPATIENT)
Dept: COLORECTAL SURGERY | Facility: CLINIC | Age: 26
End: 2021-03-11

## 2021-03-30 ENCOUNTER — APPOINTMENT (OUTPATIENT)
Dept: COLORECTAL SURGERY | Facility: CLINIC | Age: 26
End: 2021-03-30
Payer: MEDICAID

## 2021-03-30 PROCEDURE — 99072 ADDL SUPL MATRL&STAF TM PHE: CPT

## 2021-03-30 PROCEDURE — 99213 OFFICE O/P EST LOW 20 MIN: CPT

## 2021-03-30 NOTE — ASSESSMENT
[FreeTextEntry1] : Crohn's disease\par -Patient worsening symptomatically despite increased biologic therapy\par -We discussed treatment options including surgery and alternating biologic medication\par -We discussed risks and benefits of surgery in this patient especially given mid small bowel disease that may require multiple resections and stricturoplasty. We discussed risks and benefits of this procedure including bleeding, infection, anastomotic leak, possible temporary proximal stoma, and possible temporary need for TPN\par -I recommended patient obtain imaging for my review to determine if surgical resection is a viable option\par -Patient will obtain the images and followup in the office for discussion with his parents

## 2021-03-30 NOTE — HISTORY OF PRESENT ILLNESS
[FreeTextEntry1] : 26-year-old male with hospitalization at [facility] in December 2020. Patient with known Crohn's disease significantly and the mid small bowel.  Patient had been evaluated in the office in January after followup with his primary gastroenterologist. After discussion with GI, was agreed that the patient would attempt increasing his biologic therapy. Since that time he reports intermittent abdominal pain and cramping nausea and vomiting. He denies fevers or chills. Occasional bloating. Occasional diarrhea. No blood per rectum. Repeat CT scan was performed yesterday, the patient only has report no images

## 2021-04-01 ENCOUNTER — APPOINTMENT (OUTPATIENT)
Dept: COLORECTAL SURGERY | Facility: CLINIC | Age: 26
End: 2021-04-01
Payer: MEDICAID

## 2021-04-01 DIAGNOSIS — K56.609 UNSPECIFIED INTESTINAL OBSTRUCTION, UNSPECIFIED AS TO PARTIAL VERSUS COMPLETE OBSTRUCTION: ICD-10-CM

## 2021-04-01 PROCEDURE — 99213 OFFICE O/P EST LOW 20 MIN: CPT

## 2021-04-01 PROCEDURE — 99072 ADDL SUPL MATRL&STAF TM PHE: CPT

## 2021-04-01 RX ORDER — BUDESONIDE 3 MG/1
3 CAPSULE, COATED PELLETS ORAL
Qty: 90 | Refills: 0 | Status: DISCONTINUED | COMMUNITY
Start: 2020-10-14

## 2021-04-01 RX ORDER — MAGNESIUM 200 MG
1000 TABLET ORAL
Qty: 60 | Refills: 0 | Status: DISCONTINUED | COMMUNITY
Start: 2020-10-31 | End: 2021-04-01

## 2021-04-01 RX ORDER — CIPROFLOXACIN HYDROCHLORIDE 500 MG/1
500 TABLET, FILM COATED ORAL
Qty: 28 | Refills: 0 | Status: DISCONTINUED | COMMUNITY
Start: 2020-11-13

## 2021-04-01 RX ORDER — AZATHIOPRINE 75 MG/1
75 TABLET ORAL
Qty: 30 | Refills: 0 | Status: ACTIVE | COMMUNITY
Start: 2021-02-15

## 2021-04-01 RX ORDER — DICYCLOMINE HYDROCHLORIDE 10 MG/1
10 CAPSULE ORAL
Qty: 90 | Refills: 0 | Status: DISCONTINUED | COMMUNITY
Start: 2021-03-09 | End: 2021-04-01

## 2021-04-01 RX ORDER — FERROUS SULFATE TAB 325 MG (65 MG ELEMENTAL FE) 325 (65 FE) MG
325 (65 FE) TAB ORAL
Qty: 30 | Refills: 0 | Status: ACTIVE | COMMUNITY
Start: 2021-02-15

## 2021-04-01 RX ORDER — GLUC/MSM/COLGN2/HYAL/ANTIARTH3 375-375-20
TABLET ORAL
Refills: 0 | Status: DISCONTINUED | COMMUNITY
End: 2021-04-01

## 2021-04-01 RX ORDER — PREDNISONE 10 MG/1
10 TABLET ORAL
Qty: 70 | Refills: 0 | Status: DISCONTINUED | COMMUNITY
Start: 2020-11-13 | End: 2021-04-01

## 2021-04-01 RX ORDER — ERGOCALCIFEROL 1.25 MG/1
1.25 MG CAPSULE, LIQUID FILLED ORAL
Qty: 4 | Refills: 0 | Status: ACTIVE | COMMUNITY
Start: 2021-03-15

## 2021-04-01 RX ORDER — CHROMIUM 200 MCG
TABLET ORAL
Refills: 0 | Status: DISCONTINUED | COMMUNITY
End: 2021-04-01

## 2021-04-01 RX ORDER — FOLIC ACID 1 MG/1
1 TABLET ORAL
Qty: 30 | Refills: 0 | Status: DISCONTINUED | COMMUNITY
Start: 2020-10-14 | End: 2021-04-01

## 2021-04-01 RX ORDER — METRONIDAZOLE 500 MG/1
500 TABLET ORAL
Qty: 42 | Refills: 0 | Status: DISCONTINUED | COMMUNITY
Start: 2020-11-13 | End: 2021-04-01

## 2021-04-01 RX ORDER — PREDNISONE 20 MG/1
20 TABLET ORAL
Qty: 28 | Refills: 0 | Status: DISCONTINUED | COMMUNITY
Start: 2020-12-30 | End: 2021-04-01

## 2021-04-01 NOTE — HISTORY OF PRESENT ILLNESS
[FreeTextEntry1] : 26-year-old male with hospitalization at [facility] in December 2020. Patient with known Crohn's disease significantly and the mid small bowel.  Patient had been evaluated in the office in January after followup with his primary gastroenterologist. After discussion with GI, was agreed that the patient would attempt increasing his biologic therapy. Since that time he reports intermittent abdominal pain and cramping nausea and vomiting. He denies fevers or chills. Occasional bloating. Occasional diarrhea. No blood per rectum. Repeat CT scan was performed yesterday, the patient only has report no images\par \par April 1, 2021-patient presents today with CT scan images for review. Unchanged abdominal exam since her previous visit. Persistent intermittent nausea or vomiting. No fevers or chills no blood per rectum. Patient presents today for imaging reviewed to discuss surgery further no aggravating factors

## 2021-04-01 NOTE — ASSESSMENT
[FreeTextEntry1] : Jejunal ileal small bowel Crohn's disease with significant proximal dilation\par -CT scan reviewed and compared to previous imaging. Significant proximal dilation noted increased from previous study. The area appears to be localized with one long segment and 2 shorter strictures in close proximity. Possible additional stricture is noted further proximally\par -Given these findings I recommended patient undergo diagnostic laparoscopy with possible small bowel resection and possible stricturoplasty. Risks and benefits of the procedure were reviewed at length including bleeding, infection, anastomotic the leak and possible temporary stoma creation. My biggest concern with this patient's surgery would be the proximal dilation of the jejunum and this bowel tolerating anastomosis. Emile discussed diagnostic laparoscopy and is to much small bowel was involved likely backing out to pursue further medical management. Additional medical management was also discussed including steroid treatment and changing medications. It is concerning however how his bowel has become increasingly dilated since previous imaging\par -I will discuss the case further with gastroenterology\par -Patient to consider his options and call back if he wishes to proceed or with further questions

## 2022-04-04 NOTE — ED ADULT NURSE NOTE - NSIMPLEMENTINTERV_GEN_ALL_ED
17-Dec-2022
Implemented All Universal Safety Interventions:  [place] to call system. Call bell, personal items and telephone within reach. Instruct patient to call for assistance. Room bathroom lighting operational. Non-slip footwear when patient is off stretcher. Physically safe environment: no spills, clutter or unnecessary equipment. Stretcher in lowest position, wheels locked, appropriate side rails in place.

## 2023-08-28 ENCOUNTER — APPOINTMENT (OUTPATIENT)
Dept: GASTROENTEROLOGY | Facility: CLINIC | Age: 28
End: 2023-08-28
Payer: MEDICAID

## 2023-08-28 VITALS
SYSTOLIC BLOOD PRESSURE: 110 MMHG | OXYGEN SATURATION: 97 % | WEIGHT: 160 LBS | BODY MASS INDEX: 21.2 KG/M2 | HEART RATE: 75 BPM | HEIGHT: 73 IN | DIASTOLIC BLOOD PRESSURE: 68 MMHG

## 2023-08-28 PROCEDURE — 99204 OFFICE O/P NEW MOD 45 MIN: CPT

## 2023-08-28 NOTE — HISTORY OF PRESENT ILLNESS
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 Bhardwaj at [place] Patient has done very well ever since Receiving maintenance Remicade 15 mg/kg (1090 mg) every 8 weeks  Patient is currently a pediatric resident at [facility]   Denies family history of Crohn's disease Denies any extraintestinal manifestations Non-smoker

## 2023-08-28 NOTE — ASSESSMENT
[FreeTextEntry1] : Postoperative Crohn's disease Disease limited to small bowel, small bowel resection, stricturoplasty, possible ileocolic resection Colonoscopy since without any signs of disease activity Patient brought discs from recent enterography, reportedly without any disease activity  Plan Download images from recent scanned into [facility] Baseline blood testing today prior to ordering maintenance Remicade infusions Office visit again in 6 months, sooner as needed

## 2023-08-28 NOTE — PHYSICAL EXAM
[Bowel Sounds] : normal bowel sounds [Abdomen Tenderness] : non-tender [Normal] : oriented to person, place, and time [de-identified] : Well-healed scar

## 2023-08-29 LAB
25(OH)D3 SERPL-MCNC: 22 NG/ML
ALBUMIN SERPL ELPH-MCNC: 5 G/DL
ALP BLD-CCNC: 73 U/L
ALT SERPL-CCNC: 40 U/L
ANION GAP SERPL CALC-SCNC: 12 MMOL/L
AST SERPL-CCNC: 29 U/L
BILIRUB SERPL-MCNC: 0.4 MG/DL
BUN SERPL-MCNC: 11 MG/DL
CALCIUM SERPL-MCNC: 10.2 MG/DL
CHLORIDE SERPL-SCNC: 103 MMOL/L
CO2 SERPL-SCNC: 26 MMOL/L
CREAT SERPL-MCNC: 0.86 MG/DL
CRP SERPL-MCNC: <3 MG/L
EGFR: 122 ML/MIN/1.73M2
HBV CORE IGG+IGM SER QL: NONREACTIVE
HBV SURFACE AB SER QL: REACTIVE
HBV SURFACE AG SER QL: NONREACTIVE
HCT VFR BLD CALC: 44.9 %
HGB BLD-MCNC: 15.3 G/DL
MCHC RBC-ENTMCNC: 29.7 PG
MCHC RBC-ENTMCNC: 34.1 GM/DL
MCV RBC AUTO: 87.2 FL
PLATELET # BLD AUTO: 241 K/UL
POTASSIUM SERPL-SCNC: 3.7 MMOL/L
PROT SERPL-MCNC: 8.1 G/DL
RBC # BLD: 5.15 M/UL
RBC # FLD: 12.6 %
SODIUM SERPL-SCNC: 141 MMOL/L
VIT B12 SERPL-MCNC: 767 PG/ML
WBC # FLD AUTO: 6.18 K/UL

## 2023-09-07 LAB
M TB IFN-G BLD-IMP: NEGATIVE
QUANTIFERON TB PLUS MITOGEN MINUS NIL: 8.93 IU/ML
QUANTIFERON TB PLUS NIL: 0.12 IU/ML
QUANTIFERON TB PLUS TB1 MINUS NIL: -0.03 IU/ML
QUANTIFERON TB PLUS TB2 MINUS NIL: 0 IU/ML

## 2023-09-18 ENCOUNTER — APPOINTMENT (OUTPATIENT)
Dept: RHEUMATOLOGY | Facility: CLINIC | Age: 28
End: 2023-09-18
Payer: COMMERCIAL

## 2023-09-18 ENCOUNTER — MED ADMIN CHARGE (OUTPATIENT)
Age: 28
End: 2023-09-18

## 2023-09-18 VITALS — HEART RATE: 67 BPM | SYSTOLIC BLOOD PRESSURE: 111 MMHG | DIASTOLIC BLOOD PRESSURE: 73 MMHG

## 2023-09-18 VITALS
DIASTOLIC BLOOD PRESSURE: 76 MMHG | OXYGEN SATURATION: 98 % | HEART RATE: 66 BPM | RESPIRATION RATE: 16 BRPM | SYSTOLIC BLOOD PRESSURE: 118 MMHG | TEMPERATURE: 97.9 F

## 2023-09-18 PROCEDURE — 96413 CHEMO IV INFUSION 1 HR: CPT

## 2023-09-18 PROCEDURE — 96415 CHEMO IV INFUSION ADDL HR: CPT

## 2023-09-18 RX ORDER — ACETAMINOPHEN 325 MG/1
325 TABLET ORAL
Qty: 0 | Refills: 0 | Status: COMPLETED | OUTPATIENT
Start: 2023-09-07

## 2023-09-18 RX ORDER — INFLIXIMAB 100 MG/10ML
100 INJECTION, POWDER, LYOPHILIZED, FOR SOLUTION INTRAVENOUS
Qty: 1 | Refills: 0 | Status: COMPLETED | OUTPATIENT
Start: 2023-09-07

## 2023-11-01 ENCOUNTER — NON-APPOINTMENT (OUTPATIENT)
Age: 28
End: 2023-11-01

## 2023-11-07 RX ORDER — CETIRIZINE HYDROCHLORIDE 10 MG/1
10 TABLET, FILM COATED ORAL
Refills: 0 | Status: COMPLETED | OUTPATIENT
Start: 2023-11-07 | End: 1900-01-01

## 2023-11-07 RX ORDER — INFLIXIMAB 100 MG/10ML
100 INJECTION, POWDER, LYOPHILIZED, FOR SOLUTION INTRAVENOUS
Refills: 0 | Status: COMPLETED | OUTPATIENT
Start: 2023-11-07 | End: 1900-01-01

## 2023-11-07 RX ORDER — ACETAMINOPHEN 325 MG/1
325 TABLET ORAL
Refills: 0 | Status: COMPLETED | OUTPATIENT
Start: 2023-11-07 | End: 1900-01-01

## 2023-11-13 ENCOUNTER — APPOINTMENT (OUTPATIENT)
Dept: RHEUMATOLOGY | Facility: CLINIC | Age: 28
End: 2023-11-13

## 2023-11-13 DIAGNOSIS — K50.90 CROHN'S DISEASE, UNSPECIFIED, W/OUT COMPLICATIONS: ICD-10-CM

## 2023-11-21 ENCOUNTER — APPOINTMENT (OUTPATIENT)
Dept: RHEUMATOLOGY | Facility: CLINIC | Age: 28
End: 2023-11-21
Payer: COMMERCIAL

## 2023-11-21 ENCOUNTER — MED ADMIN CHARGE (OUTPATIENT)
Age: 28
End: 2023-11-21

## 2023-11-21 VITALS
TEMPERATURE: 98.3 F | SYSTOLIC BLOOD PRESSURE: 120 MMHG | RESPIRATION RATE: 16 BRPM | DIASTOLIC BLOOD PRESSURE: 75 MMHG | HEART RATE: 86 BPM | OXYGEN SATURATION: 97 %

## 2023-11-21 VITALS
HEART RATE: 67 BPM | OXYGEN SATURATION: 97 % | SYSTOLIC BLOOD PRESSURE: 106 MMHG | RESPIRATION RATE: 16 BRPM | DIASTOLIC BLOOD PRESSURE: 68 MMHG

## 2023-11-21 PROCEDURE — 96415 CHEMO IV INFUSION ADDL HR: CPT

## 2023-11-21 PROCEDURE — 96413 CHEMO IV INFUSION 1 HR: CPT

## 2023-11-21 PROCEDURE — 36415 COLL VENOUS BLD VENIPUNCTURE: CPT

## 2023-11-21 RX ORDER — ACETAMINOPHEN 325 MG/1
325 TABLET ORAL
Qty: 0 | Refills: 0 | Status: COMPLETED
Start: 2023-11-07

## 2023-11-21 RX ORDER — INFLIXIMAB 100 MG/10ML
100 INJECTION, POWDER, LYOPHILIZED, FOR SOLUTION INTRAVENOUS
Qty: 1 | Refills: 0 | Status: COMPLETED
Start: 2023-11-07

## 2023-11-30 LAB
INFLIXIMAB AB SERPL-MCNC: <22 NG/ML
INFLIXIMAB SERPL-MCNC: 11 UG/ML

## 2024-01-08 ENCOUNTER — APPOINTMENT (OUTPATIENT)
Dept: RHEUMATOLOGY | Facility: CLINIC | Age: 29
End: 2024-01-08

## 2024-01-16 ENCOUNTER — APPOINTMENT (OUTPATIENT)
Dept: RHEUMATOLOGY | Facility: CLINIC | Age: 29
End: 2024-01-16
Payer: COMMERCIAL

## 2024-01-16 ENCOUNTER — MED ADMIN CHARGE (OUTPATIENT)
Age: 29
End: 2024-01-16

## 2024-01-16 VITALS
SYSTOLIC BLOOD PRESSURE: 114 MMHG | HEART RATE: 85 BPM | OXYGEN SATURATION: 99 % | DIASTOLIC BLOOD PRESSURE: 70 MMHG | TEMPERATURE: 97.6 F | RESPIRATION RATE: 16 BRPM

## 2024-01-16 VITALS
OXYGEN SATURATION: 99 % | HEART RATE: 68 BPM | SYSTOLIC BLOOD PRESSURE: 110 MMHG | RESPIRATION RATE: 16 BRPM | DIASTOLIC BLOOD PRESSURE: 72 MMHG

## 2024-01-16 PROCEDURE — 96413 CHEMO IV INFUSION 1 HR: CPT

## 2024-01-16 PROCEDURE — 96415 CHEMO IV INFUSION ADDL HR: CPT

## 2024-01-16 RX ORDER — ACETAMINOPHEN 325 MG/1
325 TABLET ORAL
Qty: 0 | Refills: 0 | Status: COMPLETED
Start: 2023-11-07

## 2024-01-16 RX ORDER — INFLIXIMAB 100 MG/10ML
100 INJECTION, POWDER, LYOPHILIZED, FOR SOLUTION INTRAVENOUS
Qty: 1 | Refills: 0 | Status: COMPLETED
Start: 2023-11-07

## 2024-01-16 RX ORDER — CETIRIZINE HYDROCHLORIDE 10 MG/1
10 TABLET, FILM COATED ORAL
Qty: 0 | Refills: 0 | Status: COMPLETED
Start: 2023-11-07

## 2024-01-16 NOTE — HISTORY OF PRESENT ILLNESS
[N/A] : N/A [Denies] : Denies [No] : No [Yes] : Yes [22g] : 22g [End Time: ___] : Medication End Time: [unfilled] [Total Amount Administered: ___] : Total Amount Administered: [unfilled] [IV discontinued. Intact. No signs or symptoms of IV complications noted. Time: ___] : IV discontinued. Intact. No signs or symptoms of IV complications noted. Time: [unfilled] [Patient  instructed to seek medical attention with signs and symptoms of adverse effects] : Patient  instructed to seek medical attention with signs and symptoms of adverse effects [Patient left unit in no acute distress] : Patient left unit in no acute distress [Medications administered as ordered and tolerated well.] : Medications administered as ordered and tolerated well. [Medication Name: ___] : Medication Name: [unfilled] [Start Time: ___] : Start Time: [unfilled] [de-identified] : Left arm median cubital vein [de-identified] : no labs [de-identified] : Patient presents for scheduled Remicade infusion, ambulatory in [facility]. Today, patient reports overall to be doing well and continues to have 1 BM daily, formed without blood or mucous noted. Denies urgency, abdominal pain and no fistulas. No other complaints or symptoms verbalized today.  Patient tolerated infusion well, and left unit ambulatory in [facility].

## 2024-02-22 ENCOUNTER — APPOINTMENT (OUTPATIENT)
Dept: GASTROENTEROLOGY | Facility: CLINIC | Age: 29
End: 2024-02-22

## 2024-03-04 ENCOUNTER — APPOINTMENT (OUTPATIENT)
Dept: RHEUMATOLOGY | Facility: CLINIC | Age: 29
End: 2024-03-04

## 2024-03-12 ENCOUNTER — APPOINTMENT (OUTPATIENT)
Dept: RHEUMATOLOGY | Facility: CLINIC | Age: 29
End: 2024-03-12
Payer: COMMERCIAL

## 2024-03-12 ENCOUNTER — MED ADMIN CHARGE (OUTPATIENT)
Age: 29
End: 2024-03-12

## 2024-03-12 VITALS
OXYGEN SATURATION: 95 % | TEMPERATURE: 97.6 F | DIASTOLIC BLOOD PRESSURE: 77 MMHG | RESPIRATION RATE: 16 BRPM | SYSTOLIC BLOOD PRESSURE: 115 MMHG | HEART RATE: 91 BPM

## 2024-03-12 VITALS
OXYGEN SATURATION: 97 % | SYSTOLIC BLOOD PRESSURE: 106 MMHG | RESPIRATION RATE: 16 BRPM | DIASTOLIC BLOOD PRESSURE: 68 MMHG | HEART RATE: 75 BPM

## 2024-03-12 PROCEDURE — 96415 CHEMO IV INFUSION ADDL HR: CPT

## 2024-03-12 PROCEDURE — 96413 CHEMO IV INFUSION 1 HR: CPT

## 2024-03-12 RX ORDER — INFLIXIMAB 100 MG/10ML
100 INJECTION, POWDER, LYOPHILIZED, FOR SOLUTION INTRAVENOUS
Qty: 1 | Refills: 0 | Status: COMPLETED
Start: 2023-11-07

## 2024-03-12 RX ORDER — ACETAMINOPHEN 325 MG/1
325 TABLET ORAL
Qty: 0 | Refills: 0 | Status: COMPLETED
Start: 2023-11-07

## 2024-03-12 NOTE — HISTORY OF PRESENT ILLNESS
[N/A] : N/A [Denies] : Denies [No] : No [24g] : 24g [Yes] : Yes [Start Time: ___] : Medication Start Time: [unfilled] [End Time: ___] : Medication End Time: [unfilled] [Medication Name: ___] : Medication Name: [unfilled] [Total Amount Administered: ___] : Total Amount Administered: [unfilled] [IV discontinued. Intact. No signs or symptoms of IV complications noted. Time: ___] : IV discontinued. Intact. No signs or symptoms of IV complications noted. Time: [unfilled] [Patient  instructed to seek medical attention with signs and symptoms of adverse effects] : Patient  instructed to seek medical attention with signs and symptoms of adverse effects [Patient left unit in no acute distress] : Patient left unit in no acute distress [Medications administered as ordered and tolerated well.] : Medications administered as ordered and tolerated well.

## 2024-05-07 ENCOUNTER — APPOINTMENT (OUTPATIENT)
Dept: RHEUMATOLOGY | Facility: CLINIC | Age: 29
End: 2024-05-07
Payer: COMMERCIAL

## 2024-05-07 VITALS
OXYGEN SATURATION: 97 % | TEMPERATURE: 98.3 F | RESPIRATION RATE: 16 BRPM | HEART RATE: 78 BPM | SYSTOLIC BLOOD PRESSURE: 121 MMHG | DIASTOLIC BLOOD PRESSURE: 71 MMHG

## 2024-05-07 VITALS
DIASTOLIC BLOOD PRESSURE: 67 MMHG | SYSTOLIC BLOOD PRESSURE: 102 MMHG | OXYGEN SATURATION: 97 % | HEART RATE: 81 BPM | RESPIRATION RATE: 16 BRPM

## 2024-05-07 PROCEDURE — 96413 CHEMO IV INFUSION 1 HR: CPT

## 2024-05-07 PROCEDURE — 96415 CHEMO IV INFUSION ADDL HR: CPT

## 2024-05-07 RX ORDER — INFLIXIMAB 100 MG/10ML
100 INJECTION, POWDER, LYOPHILIZED, FOR SOLUTION INTRAVENOUS
Refills: 0 | Status: ACTIVE | OUTPATIENT
Start: 2024-05-07 | End: 1900-01-01

## 2024-05-07 RX ORDER — INFLIXIMAB 100 MG/10ML
100 INJECTION, POWDER, LYOPHILIZED, FOR SOLUTION INTRAVENOUS
Qty: 1 | Refills: 0 | Status: COMPLETED
Start: 2024-05-07

## 2024-05-07 RX ORDER — ACETAMINOPHEN 325 MG/1
325 TABLET ORAL
Qty: 0 | Refills: 0 | Status: COMPLETED
Start: 2024-05-07

## 2024-05-07 RX ORDER — CETIRIZINE HYDROCHLORIDE 10 MG/1
10 TABLET, FILM COATED ORAL
Qty: 0 | Refills: 0 | Status: COMPLETED
Start: 2023-11-07

## 2024-05-07 RX ORDER — ACETAMINOPHEN 325 MG/1
325 TABLET ORAL
Refills: 0 | Status: ACTIVE | OUTPATIENT
Start: 2024-05-07 | End: 1900-01-01

## 2024-05-07 RX ADMIN — ACETAMINOPHEN 0 MG: 325 TABLET ORAL at 00:00

## 2024-05-07 RX ADMIN — INFLIXIMAB 0 MG: 100 INJECTION, POWDER, LYOPHILIZED, FOR SOLUTION INTRAVENOUS at 00:00

## 2024-05-07 NOTE — HISTORY OF PRESENT ILLNESS
[N/A] : N/A [Denies] : Denies [No] : No [Yes] : Yes [24g] : 24g [Start Time: ___] : Medication Start Time: [unfilled] [End Time: ___] : Medication End Time: [unfilled] [Medication Name: ___] : Medication Name: [unfilled] [Total Amount Administered: ___] : Total Amount Administered: [unfilled] [IV discontinued. Intact. No signs or symptoms of IV complications noted. Time: ___] : IV discontinued. Intact. No signs or symptoms of IV complications noted. Time: [unfilled] [Patient  instructed to seek medical attention with signs and symptoms of adverse effects] : Patient  instructed to seek medical attention with signs and symptoms of adverse effects [Patient left unit in no acute distress] : Patient left unit in no acute distress [Medications administered as ordered and tolerated well.] : Medications administered as ordered and tolerated well. [de-identified] : Left arm median cubital vein [de-identified] : no labs [de-identified] : Patient presents for scheduled Remicade infusion, ambulatory in [facility]. Today, patient reports overall to be doing well and continues to have 1 BM daily, formed without blood or mucous noted. Denies urgency, abdominal pain and no fistulas. No other complaints or symptoms verbalized today.  Patient tolerated infusion well, and left unit ambulatory in [facility].

## 2024-07-02 ENCOUNTER — APPOINTMENT (OUTPATIENT)
Dept: RHEUMATOLOGY | Facility: CLINIC | Age: 29
End: 2024-07-02
Payer: COMMERCIAL

## 2024-07-02 VITALS
SYSTOLIC BLOOD PRESSURE: 122 MMHG | HEART RATE: 80 BPM | RESPIRATION RATE: 16 BRPM | DIASTOLIC BLOOD PRESSURE: 74 MMHG | OXYGEN SATURATION: 98 %

## 2024-07-02 VITALS
DIASTOLIC BLOOD PRESSURE: 68 MMHG | HEART RATE: 80 BPM | OXYGEN SATURATION: 100 % | SYSTOLIC BLOOD PRESSURE: 119 MMHG | TEMPERATURE: 98.1 F | RESPIRATION RATE: 16 BRPM

## 2024-07-02 PROCEDURE — 96413 CHEMO IV INFUSION 1 HR: CPT

## 2024-07-02 PROCEDURE — 96415 CHEMO IV INFUSION ADDL HR: CPT

## 2024-07-02 RX ORDER — INFLIXIMAB 100 MG/10ML
100 INJECTION, POWDER, LYOPHILIZED, FOR SOLUTION INTRAVENOUS
Qty: 1 | Refills: 0 | Status: COMPLETED
Start: 2024-05-07

## 2024-07-02 RX ORDER — ACETAMINOPHEN 325 MG/1
325 TABLET ORAL
Qty: 0 | Refills: 0 | Status: COMPLETED
Start: 2024-05-07

## 2024-07-03 ENCOUNTER — APPOINTMENT (OUTPATIENT)
Dept: INTERNAL MEDICINE | Facility: CLINIC | Age: 29
End: 2024-07-03
Payer: COMMERCIAL

## 2024-07-03 ENCOUNTER — OUTPATIENT (OUTPATIENT)
Dept: OUTPATIENT SERVICES | Facility: HOSPITAL | Age: 29
LOS: 1 days | End: 2024-07-03

## 2024-07-03 VITALS
OXYGEN SATURATION: 99 % | WEIGHT: 163 LBS | HEART RATE: 79 BPM | SYSTOLIC BLOOD PRESSURE: 116 MMHG | BODY MASS INDEX: 21.6 KG/M2 | DIASTOLIC BLOOD PRESSURE: 80 MMHG | HEIGHT: 73 IN

## 2024-07-03 DIAGNOSIS — J30.2 OTHER SEASONAL ALLERGIC RHINITIS: ICD-10-CM

## 2024-07-03 DIAGNOSIS — M25.522 PAIN IN LEFT ELBOW: ICD-10-CM

## 2024-07-03 DIAGNOSIS — Z87.19 PERSONAL HISTORY OF OTHER DISEASES OF THE DIGESTIVE SYSTEM: ICD-10-CM

## 2024-07-03 PROCEDURE — 99385 PREV VISIT NEW AGE 18-39: CPT

## 2024-07-03 RX ORDER — CHLORHEXIDINE GLUCONATE 4 %
LIQUID (ML) TOPICAL DAILY
Qty: 90 | Refills: 1 | Status: ACTIVE | COMMUNITY
Start: 2024-07-03

## 2024-07-03 RX ORDER — FLUTICASONE PROPIONATE 50 UG/1
50 SPRAY, METERED NASAL
Qty: 16 | Refills: 2 | Status: ACTIVE | COMMUNITY
Start: 2024-07-03 | End: 1900-01-01

## 2024-07-04 LAB — CRP SERPL-MCNC: <3 MG/L

## 2024-07-05 DIAGNOSIS — Z00.00 ENCOUNTER FOR GENERAL ADULT MEDICAL EXAMINATION W/OUT ABNORMAL FINDINGS: ICD-10-CM

## 2024-07-05 DIAGNOSIS — K50.90 CROHN'S DISEASE, UNSPECIFIED, WITHOUT COMPLICATIONS: ICD-10-CM

## 2024-07-05 DIAGNOSIS — J30.2 OTHER SEASONAL ALLERGIC RHINITIS: ICD-10-CM

## 2024-07-05 DIAGNOSIS — Z00.00 ENCOUNTER FOR GENERAL ADULT MEDICAL EXAMINATION WITHOUT ABNORMAL FINDINGS: ICD-10-CM

## 2024-07-05 DIAGNOSIS — M25.522 PAIN IN LEFT ELBOW: ICD-10-CM

## 2024-07-05 LAB
25(OH)D3 SERPL-MCNC: 31.2 NG/ML
ALBUMIN SERPL ELPH-MCNC: 5.1 G/DL
ALP BLD-CCNC: 86 U/L
ALT SERPL-CCNC: 109 U/L
ANION GAP SERPL CALC-SCNC: 13 MMOL/L
AST SERPL-CCNC: 54 U/L
BASOPHILS # BLD AUTO: 0.09 K/UL
BASOPHILS NFR BLD AUTO: 1.6 %
BILIRUB SERPL-MCNC: 0.3 MG/DL
BUN SERPL-MCNC: 13 MG/DL
C TRACH RRNA SPEC QL NAA+PROBE: NOT DETECTED
CALCIUM SERPL-MCNC: 10 MG/DL
CHLORIDE SERPL-SCNC: 102 MMOL/L
CHOLEST SERPL-MCNC: 172 MG/DL
CO2 SERPL-SCNC: 26 MMOL/L
CREAT SERPL-MCNC: 0.76 MG/DL
EGFR: 126 ML/MIN/1.73M2
EOSINOPHIL # BLD AUTO: 0.41 K/UL
EOSINOPHIL NFR BLD AUTO: 7.4 %
ESTIMATED AVERAGE GLUCOSE: 100 MG/DL
GLUCOSE SERPL-MCNC: 85 MG/DL
HBA1C MFR BLD HPLC: 5.1 %
HBV CORE IGG+IGM SER QL: NONREACTIVE
HBV SURFACE AB SER QL: REACTIVE
HBV SURFACE AG SER QL: NONREACTIVE
HCT VFR BLD CALC: 48.4 %
HCV AB SER QL: NONREACTIVE
HCV S/CO RATIO: 0.09 S/CO
HDLC SERPL-MCNC: 55 MG/DL
HGB BLD-MCNC: 16.2 G/DL
HIV1+2 AB SPEC QL IA.RAPID: NONREACTIVE
IMM GRANULOCYTES NFR BLD AUTO: 0.2 %
LDLC SERPL CALC-MCNC: 103 MG/DL
LYMPHOCYTES # BLD AUTO: 2.41 K/UL
LYMPHOCYTES NFR BLD AUTO: 43.4 %
MAN DIFF?: NORMAL
MCHC RBC-ENTMCNC: 29.9 PG
MCHC RBC-ENTMCNC: 33.5 GM/DL
MCV RBC AUTO: 89.3 FL
MONOCYTES # BLD AUTO: 0.36 K/UL
MONOCYTES NFR BLD AUTO: 6.5 %
N GONORRHOEA RRNA SPEC QL NAA+PROBE: NOT DETECTED
NEUTROPHILS # BLD AUTO: 2.27 K/UL
NEUTROPHILS NFR BLD AUTO: 40.9 %
NONHDLC SERPL-MCNC: 117 MG/DL
PLATELET # BLD AUTO: 282 K/UL
POTASSIUM SERPL-SCNC: 4.4 MMOL/L
PROT SERPL-MCNC: 8.7 G/DL
RBC # BLD: 5.42 M/UL
RBC # FLD: 12.8 %
SODIUM SERPL-SCNC: 141 MMOL/L
SOURCE AMPLIFICATION: NORMAL
T PALLIDUM AB SER QL IA: NEGATIVE
TRIGL SERPL-MCNC: 72 MG/DL
WBC # FLD AUTO: 5.55 K/UL

## 2024-07-07 LAB
ALBUMIN SERPL ELPH-MCNC: 5.2 G/DL
ALP BLD-CCNC: 86 U/L
ALT SERPL-CCNC: 107 U/L
ANION GAP SERPL CALC-SCNC: 20 MMOL/L
AST SERPL-CCNC: 54 U/L
BILIRUB SERPL-MCNC: 0.3 MG/DL
BUN SERPL-MCNC: 13 MG/DL
CALCIUM SERPL-MCNC: 10.1 MG/DL
CHLORIDE SERPL-SCNC: 100 MMOL/L
CO2 SERPL-SCNC: 21 MMOL/L
CREAT SERPL-MCNC: 0.81 MG/DL
CRP SERPL-MCNC: <3 MG/L
EGFR: 124 ML/MIN/1.73M2
POTASSIUM SERPL-SCNC: 4.6 MMOL/L
PROT SERPL-MCNC: 8.7 G/DL
SODIUM SERPL-SCNC: 141 MMOL/L

## 2024-07-15 LAB
INFLIXIMAB AB SERPL-MCNC: <22 NG/ML
INFLIXIMAB SERPL-MCNC: 309 UG/ML

## 2024-07-16 LAB
INFLIXIMAB AB SERPL-MCNC: <22 NG/ML
INFLIXIMAB SERPL-MCNC: 291 UG/ML

## 2024-08-27 ENCOUNTER — LABORATORY RESULT (OUTPATIENT)
Age: 29
End: 2024-08-27

## 2024-08-27 ENCOUNTER — APPOINTMENT (OUTPATIENT)
Dept: RHEUMATOLOGY | Facility: CLINIC | Age: 29
End: 2024-08-27
Payer: COMMERCIAL

## 2024-08-27 VITALS
SYSTOLIC BLOOD PRESSURE: 107 MMHG | DIASTOLIC BLOOD PRESSURE: 72 MMHG | OXYGEN SATURATION: 96 % | HEART RATE: 70 BPM | TEMPERATURE: 98.2 F | RESPIRATION RATE: 16 BRPM

## 2024-08-27 VITALS
HEART RATE: 75 BPM | DIASTOLIC BLOOD PRESSURE: 60 MMHG | OXYGEN SATURATION: 99 % | SYSTOLIC BLOOD PRESSURE: 96 MMHG | RESPIRATION RATE: 16 BRPM

## 2024-08-27 PROCEDURE — 96415 CHEMO IV INFUSION ADDL HR: CPT

## 2024-08-27 PROCEDURE — 96413 CHEMO IV INFUSION 1 HR: CPT

## 2024-08-27 RX ORDER — ACETAMINOPHEN 325 MG/1
325 TABLET ORAL
Qty: 0 | Refills: 0 | Status: COMPLETED
Start: 2024-05-07

## 2024-08-28 RX ORDER — INFLIXIMAB 100 MG/10ML
100 INJECTION, POWDER, LYOPHILIZED, FOR SOLUTION INTRAVENOUS
Qty: 1 | Refills: 0 | Status: COMPLETED | COMMUNITY
Start: 2024-05-07

## 2024-08-28 NOTE — HISTORY OF PRESENT ILLNESS
[N/A] : N/A [Denies] : Denies [No] : No [Yes] : Yes [Declined] : Declined [Informed consent documented in EHR.] : Informed consent documented in EHR. [Right upper extremity] : Right upper extremity [22g] : 22g [Start Time: ___] : Medication Start Time: [unfilled] [End Time: ___] : Medication End Time: [unfilled] [Medication Name: ___] : Medication Name: [unfilled] [Total Amount Administered: ___] : Total Amount Administered: [unfilled] [IV discontinued. Intact. No signs or symptoms of IV complications noted. Time: ___] : IV discontinued. Intact. No signs or symptoms of IV complications noted. Time: [unfilled] [Patient  instructed to seek medical attention with signs and symptoms of adverse effects] : Patient  instructed to seek medical attention with signs and symptoms of adverse effects [Patient left unit in no acute distress] : Patient left unit in no acute distress [Medications administered as ordered and tolerated well.] : Medications administered as ordered and tolerated well. [de-identified] : dorsal hand vein  [de-identified] : no labs [de-identified] : Patient presents for scheduled Remicade infusion, doing well. Patient denies recent infection, antibiotic use or hospitalizations. Patient reports overall to be doing well and continues to have 1 BM daily, formed without blood or mucous noted. Denies urgency, abdominal pain and no fistulas.  No other complaints or symptoms verbalized today. Patient premedicated with Tylenol and infusion tolerated well.

## 2024-08-29 LAB — HBV SURFACE AG SER QL: NONREACTIVE

## 2024-08-31 LAB
M TB IFN-G BLD-IMP: NEGATIVE
QUANTIFERON TB PLUS MITOGEN MINUS NIL: >10 IU/ML
QUANTIFERON TB PLUS NIL: 0.04 IU/ML
QUANTIFERON TB PLUS TB1 MINUS NIL: 0.01 IU/ML
QUANTIFERON TB PLUS TB2 MINUS NIL: 0 IU/ML

## 2024-09-04 RX ORDER — INFLIXIMAB 100 MG/10ML
100 INJECTION, POWDER, LYOPHILIZED, FOR SOLUTION INTRAVENOUS
Refills: 0 | Status: ACTIVE | OUTPATIENT
Start: 2024-09-04

## 2024-09-06 ENCOUNTER — APPOINTMENT (OUTPATIENT)
Dept: GASTROENTEROLOGY | Facility: CLINIC | Age: 29
End: 2024-09-06
Payer: COMMERCIAL

## 2024-09-06 VITALS
HEART RATE: 73 BPM | BODY MASS INDEX: 21.2 KG/M2 | WEIGHT: 160 LBS | SYSTOLIC BLOOD PRESSURE: 123 MMHG | RESPIRATION RATE: 14 BRPM | OXYGEN SATURATION: 97 % | DIASTOLIC BLOOD PRESSURE: 73 MMHG | HEIGHT: 73 IN

## 2024-09-06 DIAGNOSIS — K50.90 CROHN'S DISEASE, UNSPECIFIED, W/OUT COMPLICATIONS: ICD-10-CM

## 2024-09-06 PROCEDURE — G2211 COMPLEX E/M VISIT ADD ON: CPT

## 2024-09-06 PROCEDURE — 99213 OFFICE O/P EST LOW 20 MIN: CPT

## 2024-09-06 NOTE — HISTORY OF PRESENT ILLNESS
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 bad chicken

## 2024-10-22 ENCOUNTER — APPOINTMENT (OUTPATIENT)
Dept: RHEUMATOLOGY | Facility: CLINIC | Age: 29
End: 2024-10-22
Payer: COMMERCIAL

## 2024-10-22 ENCOUNTER — MED ADMIN CHARGE (OUTPATIENT)
Age: 29
End: 2024-10-22

## 2024-10-22 VITALS
OXYGEN SATURATION: 97 % | HEART RATE: 69 BPM | RESPIRATION RATE: 16 BRPM | SYSTOLIC BLOOD PRESSURE: 118 MMHG | DIASTOLIC BLOOD PRESSURE: 70 MMHG

## 2024-10-22 VITALS
DIASTOLIC BLOOD PRESSURE: 70 MMHG | HEART RATE: 110 BPM | OXYGEN SATURATION: 95 % | SYSTOLIC BLOOD PRESSURE: 104 MMHG | TEMPERATURE: 98.3 F | RESPIRATION RATE: 16 BRPM

## 2024-10-22 PROCEDURE — 96413 CHEMO IV INFUSION 1 HR: CPT

## 2024-10-22 PROCEDURE — 96415 CHEMO IV INFUSION ADDL HR: CPT

## 2024-10-22 RX ORDER — ACETAMINOPHEN 325 MG/1
325 TABLET ORAL
Qty: 0 | Refills: 0 | Status: COMPLETED
Start: 2024-05-07

## 2024-10-22 RX ORDER — INFLIXIMAB 100 MG/10ML
100 INJECTION, POWDER, LYOPHILIZED, FOR SOLUTION INTRAVENOUS
Qty: 1 | Refills: 0 | Status: COMPLETED
Start: 2024-09-04

## 2024-12-17 ENCOUNTER — APPOINTMENT (OUTPATIENT)
Dept: RHEUMATOLOGY | Facility: CLINIC | Age: 29
End: 2024-12-17
Payer: COMMERCIAL

## 2024-12-17 ENCOUNTER — MED ADMIN CHARGE (OUTPATIENT)
Age: 29
End: 2024-12-17

## 2024-12-17 VITALS
SYSTOLIC BLOOD PRESSURE: 108 MMHG | RESPIRATION RATE: 16 BRPM | OXYGEN SATURATION: 98 % | DIASTOLIC BLOOD PRESSURE: 70 MMHG | HEART RATE: 84 BPM | TEMPERATURE: 98 F

## 2024-12-17 VITALS
SYSTOLIC BLOOD PRESSURE: 106 MMHG | OXYGEN SATURATION: 98 % | DIASTOLIC BLOOD PRESSURE: 65 MMHG | RESPIRATION RATE: 16 BRPM | HEART RATE: 71 BPM

## 2024-12-17 PROCEDURE — 96413 CHEMO IV INFUSION 1 HR: CPT

## 2024-12-17 PROCEDURE — 96415 CHEMO IV INFUSION ADDL HR: CPT

## 2024-12-17 RX ORDER — ACETAMINOPHEN 325 MG/1
325 TABLET ORAL
Qty: 0 | Refills: 0 | Status: COMPLETED
Start: 2024-05-07

## 2024-12-17 RX ORDER — INFLIXIMAB 100 MG/10ML
100 INJECTION, POWDER, LYOPHILIZED, FOR SOLUTION INTRAVENOUS
Qty: 1 | Refills: 0 | Status: COMPLETED
Start: 2024-09-04

## 2025-01-08 ENCOUNTER — APPOINTMENT (OUTPATIENT)
Dept: INTERNAL MEDICINE | Facility: CLINIC | Age: 30
End: 2025-01-08
Payer: COMMERCIAL

## 2025-01-08 ENCOUNTER — OUTPATIENT (OUTPATIENT)
Dept: OUTPATIENT SERVICES | Facility: HOSPITAL | Age: 30
LOS: 1 days | End: 2025-01-08

## 2025-01-08 VITALS
OXYGEN SATURATION: 98 % | HEART RATE: 74 BPM | WEIGHT: 160 LBS | BODY MASS INDEX: 21.2 KG/M2 | HEIGHT: 73 IN | DIASTOLIC BLOOD PRESSURE: 80 MMHG | SYSTOLIC BLOOD PRESSURE: 98 MMHG

## 2025-01-08 DIAGNOSIS — H61.23 IMPACTED CERUMEN, BILATERAL: ICD-10-CM

## 2025-01-08 PROCEDURE — 99202 OFFICE O/P NEW SF 15 MIN: CPT

## 2025-01-09 DIAGNOSIS — H61.23 IMPACTED CERUMEN, BILATERAL: ICD-10-CM

## 2025-01-17 ENCOUNTER — APPOINTMENT (OUTPATIENT)
Dept: OTOLARYNGOLOGY | Facility: CLINIC | Age: 30
End: 2025-01-17

## 2025-02-11 ENCOUNTER — MED ADMIN CHARGE (OUTPATIENT)
Age: 30
End: 2025-02-11

## 2025-02-11 ENCOUNTER — APPOINTMENT (OUTPATIENT)
Dept: RHEUMATOLOGY | Facility: CLINIC | Age: 30
End: 2025-02-11
Payer: COMMERCIAL

## 2025-02-11 VITALS
OXYGEN SATURATION: 99 % | RESPIRATION RATE: 16 BRPM | SYSTOLIC BLOOD PRESSURE: 103 MMHG | DIASTOLIC BLOOD PRESSURE: 68 MMHG | HEART RATE: 77 BPM

## 2025-02-11 VITALS
TEMPERATURE: 98.3 F | DIASTOLIC BLOOD PRESSURE: 82 MMHG | HEART RATE: 79 BPM | SYSTOLIC BLOOD PRESSURE: 125 MMHG | OXYGEN SATURATION: 98 % | RESPIRATION RATE: 16 BRPM

## 2025-02-11 PROCEDURE — 96415 CHEMO IV INFUSION ADDL HR: CPT

## 2025-02-11 PROCEDURE — 96413 CHEMO IV INFUSION 1 HR: CPT

## 2025-02-11 RX ORDER — INFLIXIMAB 100 MG/10ML
100 INJECTION, POWDER, LYOPHILIZED, FOR SOLUTION INTRAVENOUS
Qty: 1 | Refills: 0 | Status: COMPLETED
Start: 2024-09-04

## 2025-02-11 RX ORDER — ACETAMINOPHEN 325 MG/1
325 TABLET ORAL
Qty: 0 | Refills: 0 | Status: COMPLETED
Start: 2024-05-07

## 2025-03-07 ENCOUNTER — APPOINTMENT (OUTPATIENT)
Dept: GASTROENTEROLOGY | Facility: CLINIC | Age: 30
End: 2025-03-07

## 2025-04-08 ENCOUNTER — APPOINTMENT (OUTPATIENT)
Dept: RHEUMATOLOGY | Facility: CLINIC | Age: 30
End: 2025-04-08
Payer: COMMERCIAL

## 2025-04-08 ENCOUNTER — MED ADMIN CHARGE (OUTPATIENT)
Age: 30
End: 2025-04-08

## 2025-04-08 VITALS
OXYGEN SATURATION: 96 % | RESPIRATION RATE: 16 BRPM | TEMPERATURE: 98.1 F | SYSTOLIC BLOOD PRESSURE: 115 MMHG | HEART RATE: 101 BPM | DIASTOLIC BLOOD PRESSURE: 80 MMHG

## 2025-04-08 VITALS
RESPIRATION RATE: 16 BRPM | DIASTOLIC BLOOD PRESSURE: 74 MMHG | SYSTOLIC BLOOD PRESSURE: 128 MMHG | OXYGEN SATURATION: 97 % | HEART RATE: 89 BPM

## 2025-04-08 PROCEDURE — 36415 COLL VENOUS BLD VENIPUNCTURE: CPT

## 2025-04-08 PROCEDURE — 96413 CHEMO IV INFUSION 1 HR: CPT

## 2025-04-08 PROCEDURE — 96415 CHEMO IV INFUSION ADDL HR: CPT

## 2025-04-08 RX ORDER — INFLIXIMAB 100 MG/10ML
100 INJECTION, POWDER, LYOPHILIZED, FOR SOLUTION INTRAVENOUS
Qty: 1 | Refills: 0 | Status: COMPLETED
Start: 2024-09-04

## 2025-04-09 RX ORDER — INFLIXIMAB 100 MG/10ML
100 INJECTION, POWDER, LYOPHILIZED, FOR SOLUTION INTRAVENOUS
Refills: 0 | Status: ACTIVE | OUTPATIENT
Start: 2025-04-08

## 2025-04-09 RX ORDER — ACETAMINOPHEN 325 MG/1
325 TABLET ORAL
Refills: 0 | Status: ACTIVE | OUTPATIENT
Start: 2025-04-08

## 2025-04-18 NOTE — HISTORY OF PRESENT ILLNESS
Pt and spouse given discharge instructions, patient education, 1 prescriptions and follow up information. Pt and spouse verbalizes understanding. All questions answered. Pt discharged to home in private vehicle, ambulatory. Pt A&Ox4, RA and pain controlled.    [N/A] : N/A [Denies] : Denies [No] : No [Yes] : Yes [Declined] : Declined [Informed consent documented in EHR.] : Informed consent documented in EHR. [Right upper extremity] : Right upper extremity [22g] : 22g [Start Time: ___] : Medication Start Time: [unfilled] [End Time: ___] : Medication End Time: [unfilled] [Medication Name: ___] : Medication Name: [unfilled] [Total Amount Administered: ___] : Total Amount Administered: [unfilled] [IV discontinued. Intact. No signs or symptoms of IV complications noted. Time: ___] : IV discontinued. Intact. No signs or symptoms of IV complications noted. Time: [unfilled] [Patient  instructed to seek medical attention with signs and symptoms of adverse effects] : Patient  instructed to seek medical attention with signs and symptoms of adverse effects [Patient left unit in no acute distress] : Patient left unit in no acute distress [Medications administered as ordered and tolerated well.] : Medications administered as ordered and tolerated well. [de-identified] : dorsal hand vein  [de-identified] : no labs [de-identified] : Patient presents for scheduled Remicade infusion, doing well. Patient denies recent infection, antibiotic use or hospitalizations. Patient reports overall to be doing well and continues to have 1 BM daily, formed without blood or mucous noted. Denies urgency, abdominal pain and no fistulas.  No other complaints or symptoms verbalized today. Patient premedicated with Tylenol and infusion tolerated well. done

## 2025-06-03 ENCOUNTER — MED ADMIN CHARGE (OUTPATIENT)
Age: 30
End: 2025-06-03

## 2025-06-03 ENCOUNTER — APPOINTMENT (OUTPATIENT)
Dept: RHEUMATOLOGY | Facility: CLINIC | Age: 30
End: 2025-06-03
Payer: COMMERCIAL

## 2025-06-03 VITALS
RESPIRATION RATE: 16 BRPM | HEART RATE: 86 BPM | OXYGEN SATURATION: 97 % | SYSTOLIC BLOOD PRESSURE: 109 MMHG | DIASTOLIC BLOOD PRESSURE: 73 MMHG

## 2025-06-03 VITALS
DIASTOLIC BLOOD PRESSURE: 67 MMHG | HEART RATE: 96 BPM | TEMPERATURE: 98.7 F | RESPIRATION RATE: 16 BRPM | SYSTOLIC BLOOD PRESSURE: 109 MMHG | OXYGEN SATURATION: 98 %

## 2025-06-03 PROCEDURE — 96415 CHEMO IV INFUSION ADDL HR: CPT

## 2025-06-03 PROCEDURE — 36415 COLL VENOUS BLD VENIPUNCTURE: CPT

## 2025-06-03 PROCEDURE — 96413 CHEMO IV INFUSION 1 HR: CPT

## 2025-06-03 RX ORDER — ACETAMINOPHEN 325 MG/1
325 TABLET ORAL
Qty: 0 | Refills: 0 | Status: COMPLETED
Start: 2025-04-08

## 2025-06-03 RX ORDER — INFLIXIMAB 100 MG/10ML
100 INJECTION, POWDER, LYOPHILIZED, FOR SOLUTION INTRAVENOUS
Qty: 1 | Refills: 0 | Status: COMPLETED
Start: 2025-04-08

## 2025-07-29 ENCOUNTER — MED ADMIN CHARGE (OUTPATIENT)
Age: 30
End: 2025-07-29

## 2025-07-29 ENCOUNTER — APPOINTMENT (OUTPATIENT)
Dept: RHEUMATOLOGY | Facility: CLINIC | Age: 30
End: 2025-07-29
Payer: COMMERCIAL

## 2025-07-29 VITALS
HEART RATE: 93 BPM | DIASTOLIC BLOOD PRESSURE: 66 MMHG | RESPIRATION RATE: 16 BRPM | SYSTOLIC BLOOD PRESSURE: 101 MMHG | TEMPERATURE: 98.6 F | OXYGEN SATURATION: 95 %

## 2025-07-29 VITALS
SYSTOLIC BLOOD PRESSURE: 102 MMHG | OXYGEN SATURATION: 97 % | DIASTOLIC BLOOD PRESSURE: 67 MMHG | RESPIRATION RATE: 16 BRPM | HEART RATE: 79 BPM

## 2025-07-29 PROCEDURE — 96415 CHEMO IV INFUSION ADDL HR: CPT

## 2025-07-29 PROCEDURE — 36415 COLL VENOUS BLD VENIPUNCTURE: CPT

## 2025-07-29 PROCEDURE — 96413 CHEMO IV INFUSION 1 HR: CPT

## 2025-07-29 RX ORDER — INFLIXIMAB 100 MG/10ML
100 INJECTION, POWDER, LYOPHILIZED, FOR SOLUTION INTRAVENOUS
Qty: 1 | Refills: 0 | Status: COMPLETED
Start: 2025-04-08

## 2025-07-29 RX ORDER — ACETAMINOPHEN 325 MG/1
325 TABLET ORAL
Qty: 0 | Refills: 0 | Status: COMPLETED
Start: 2025-04-08